# Patient Record
Sex: FEMALE | Race: WHITE | NOT HISPANIC OR LATINO | Employment: OTHER | URBAN - METROPOLITAN AREA
[De-identification: names, ages, dates, MRNs, and addresses within clinical notes are randomized per-mention and may not be internally consistent; named-entity substitution may affect disease eponyms.]

---

## 2023-08-08 ENCOUNTER — APPOINTMENT (OUTPATIENT)
Dept: PRIMARY CARE | Facility: CLINIC | Age: 83
End: 2023-08-08
Payer: MEDICARE

## 2023-09-28 ENCOUNTER — APPOINTMENT (OUTPATIENT)
Dept: PRIMARY CARE | Facility: CLINIC | Age: 83
End: 2023-09-28
Payer: MEDICARE

## 2023-10-14 ENCOUNTER — HOSPITAL ENCOUNTER (EMERGENCY)
Facility: HOSPITAL | Age: 83
Discharge: HOME | End: 2023-10-14
Attending: NURSE PRACTITIONER
Payer: MEDICARE

## 2023-10-14 VITALS
RESPIRATION RATE: 12 BRPM | DIASTOLIC BLOOD PRESSURE: 85 MMHG | HEIGHT: 62 IN | TEMPERATURE: 98.5 F | HEART RATE: 94 BPM | BODY MASS INDEX: 28.52 KG/M2 | SYSTOLIC BLOOD PRESSURE: 128 MMHG | OXYGEN SATURATION: 95 % | WEIGHT: 155 LBS

## 2023-10-14 DIAGNOSIS — R11.10 RETCHING: Primary | ICD-10-CM

## 2023-10-14 PROCEDURE — S0119 ONDANSETRON 4 MG: HCPCS | Mod: SE

## 2023-10-14 PROCEDURE — 2500000004 HC RX 250 GENERAL PHARMACY W/ HCPCS (ALT 636 FOR OP/ED): Mod: SE

## 2023-10-14 PROCEDURE — 2500000005 HC RX 250 GENERAL PHARMACY W/O HCPCS: Mod: SE

## 2023-10-14 PROCEDURE — 99283 EMERGENCY DEPT VISIT LOW MDM: CPT

## 2023-10-14 RX ORDER — ONDANSETRON 4 MG/1
4 TABLET, FILM COATED ORAL EVERY 6 HOURS
Qty: 12 TABLET | Refills: 0 | Status: SHIPPED | OUTPATIENT
Start: 2023-10-14 | End: 2023-10-17

## 2023-10-14 RX ORDER — ONDANSETRON 4 MG/1
4 TABLET, ORALLY DISINTEGRATING ORAL ONCE
Status: COMPLETED | OUTPATIENT
Start: 2023-10-14 | End: 2023-10-14

## 2023-10-14 RX ORDER — ONDANSETRON 4 MG/1
TABLET, ORALLY DISINTEGRATING ORAL
Status: COMPLETED
Start: 2023-10-14 | End: 2023-10-14

## 2023-10-14 RX ORDER — ONDANSETRON 4 MG/1
TABLET, FILM COATED ORAL
Status: DISCONTINUED
Start: 2023-10-14 | End: 2023-10-14 | Stop reason: WASHOUT

## 2023-10-14 RX ADMIN — ONDANSETRON 4 MG: 4 TABLET, ORALLY DISINTEGRATING ORAL at 22:12

## 2023-10-14 ASSESSMENT — PAIN - FUNCTIONAL ASSESSMENT: PAIN_FUNCTIONAL_ASSESSMENT: 0-10

## 2023-10-14 ASSESSMENT — PAIN SCALES - GENERAL: PAINLEVEL_OUTOF10: 0 - NO PAIN

## 2023-10-15 NOTE — ED PROVIDER NOTES
"HPI   Chief Complaint   Patient presents with   • Nausea     Nausea and dry heeving since yesterday       HPI:  83-year-old female presents to the emergency room for \"dry heaving\".  She states that she had some cranberry juice earlier this evening and afterwards started having these episodes of dry heaving.  She states nothing has come up.  She does have a history of esophageal strictures and has had food boluses in the past.  She states that usually she can feel something stuck but nothing feels stuck right now.  She is able to keep fluids down and has not thrown up any vomit at all including any liquid.  She is just having retching.  Denies any fevers, body aches, chills or malaise.                                [place] Coma Scale Score: 15                  Patient History   Past Medical History:   Diagnosis Date   • Bipolar disorder, unspecified (CMS/[facility]) 05/04/2017    Psychosis, bipolar affective   • Encounter for gynecological examination (general) (routine) without abnormal findings     Well female exam with routine gynecological exam   • History of falling 06/16/2017    History of fall   • Other conditions influencing health status 02/09/2016    No significant past medical history   • Personal history of colonic polyps 08/22/2019    History of colonic polyps   • Personal history of other diseases of the digestive system 02/09/2016    History of gallstones   • Personal history of other diseases of the digestive system 02/09/2016    History of hiatal hernia   • Personal history of other malignant neoplasm of skin 02/09/2016    History of malignant neoplasm of skin     Past Surgical History:   Procedure Laterality Date   • APPENDECTOMY  02/09/2016    Appendectomy   • CHOLECYSTECTOMY  08/22/2019    Cholecystectomy   • CT ABDOMEN ANGIOGRAM W AND/OR WO IV CONTRAST  7/27/2013    CT ABDOMEN ANGIOGRAM W AND/OR WO IV CONTRAST [facility]GAC[facility]   • GASTRIC FUNDOPLICATION  08/22/2019    Esophagogastric Fundoplasty " Nissen Fundoplication   • HERNIA REPAIR  08/22/2019    Hernia Repair   • OTHER SURGICAL HISTORY  12/29/2019    Esophagogastroduodenoscopy   • OTHER SURGICAL HISTORY  02/09/2016    Biopsy Skin   • TOTAL HIP ARTHROPLASTY  08/22/2019    Total Hip Replacement     No family history on file.  Social History     Tobacco Use   • Smoking status: Not on file   • Smokeless tobacco: Not on file   Substance Use Topics   • Alcohol use: Not on file   • Drug use: Not on file       Physical Exam   ED Triage Vitals [10/14/23 2133]   Temp Heart Rate Resp BP   36.9 °C (98.5 °F) 94 12 128/85      SpO2 Temp Source Heart Rate Source Patient Position   95 % Tympanic -- Sitting      BP Location FiO2 (%)     Left arm --       Physical Exam  Constitutional:       Appearance: Normal appearance.   HENT:      Head: Normocephalic and atraumatic.      Nose: Nose normal.   Eyes:      Extraocular Movements: Extraocular movements intact.      Pupils: Pupils are equal, round, and reactive to light.   Cardiovascular:      Rate and Rhythm: Normal rate and regular rhythm.   Pulmonary:      Effort: Pulmonary effort is normal.      Breath sounds: Normal breath sounds.   Abdominal:      General: Bowel sounds are normal.   Musculoskeletal:         General: Normal range of motion.      Cervical back: Normal range of motion.   Skin:     General: Skin is warm and dry.      Capillary Refill: Capillary refill takes 2 to 3 seconds.   Neurological:      General: No focal deficit present.      Mental Status: She is alert and oriented to person, place, and time.         ED Course & MDM   Diagnoses as of 10/14/23 3581   Retching       Medical Decision Making  Patient had Zofran and a fluid challenge and she was able to keep down an entire glass of water.  She was watched for 15 to 20 minutes and had no emesis.  She will be prescribed some Zofran and referred to GI for follow-up.  Return to the emergency room with any worsening symptoms or vomiting and not being able  to keep fluids down.        Procedure  Procedures     Luc Diego, TRINI-JERAD  10/14/23 6984

## 2024-02-19 NOTE — PROGRESS NOTES
Subjective   Reason for Visit: Nicole Samson is an 84 y.o. female here for a Medicare Wellness visit.     Past Medical, Surgical, and Family History reviewed and updated in chart.    Reviewed all medications by prescribing practitioner or clinical pharmacist (such as prescriptions, OTCs, herbal therapies and supplements) and documented in the medical record.    HPI    Patient Care Team:  Adilene Corrales MD as PCP - General (Family Medicine)  Gill Hartley DO as PCP - [employer] PCP     Nicole presents as a new patient for a CPE. She lives alone. She does not have family in the area but does have friends. She does not drive but has friends who drives her and she takes public transportation. She does not have a smoking history.  She previously saw a local provider last summer for 1 visit but did not follow-up there. That time she had mild elevation of her blood pressure and was told to monitor it and follow-up in 3 months. She has a wrist monitor at home and periodically checks her blood pressure with normal numbers.  She had a coronary calcium score in 2022 of 718.  Subsequently she had a normal nuclear stress test.  She has had no recent falls.  She does notice some imbalance.  She has gone through physical therapy after her hip replacement a few years ago.  Last mammogram was 2022.  Consultants: psychiatry for bipolar disorder.  She sees an eye doctor regularly for diagnosis of macular degeneration.    Review of Systems  Constitutional Symptoms: She is negative for headaches, fatigue, sleep disturbance.   Eyes: She is negative for loss or blurring of vision.   Cardiovascular: She is negative for chest pain, palpitations.  Occasionally get swelling of her legs at the end of the day.  She eats a lot of prepared foods.  Respiratory: She is negative for shortness of breath, dyspnea on exertion, coughing.   Breast: She is negative for tenderness, masses  Gastrointestinal: She is negative for  "indigestion, abdominal pain, change in bowel habits, blood in stool.   Female Genitourinary: She is negative for nocturia.  She is negative for frequency.   Musculoskeletal: She is negative for myalgias, cramps.   Integumentary: She is negative for skin trouble or rash, non-healing skin lesion.   Neurological: She is negative for numbness, tingling   Endocrine: She is negative for heat or cold intolerance, polyuria, polydipsia, tremor.       Objective   Vitals:  /88   Pulse 80   Ht 1.537 m (5' 0.5\")   Wt 72.6 kg (160 lb)   SpO2 98%   BMI 30.73 kg/m²       Physical Exam  She is alert and in no acute distress  Eyes PERRL, EOMI.  She has bilateral hearing aids in place.,  Nose is noncongested.  Throat is noninjected and without exudate.  Neck is supple without adenopathy.  Lungs are clear to auscultation.  Heart is regular in rhythm and rate.  Normal S1 and S2 without murmurs or gallops.  Extremities are without edema.  Breasts are without masses or tenderness.  No axillary adenopathy.  Abdomen is soft and nontender.  No rebound guarding or masses.  Skill skeletal is intact.  She has a slight kyphosis of her back. No joint inflammation.  Skin shows normal turgor.  Neuro is intact and nonfocal.  Mood and affect are healthy.    Assessment/Plan   Problem List Items Addressed This Visit    None  Visit Diagnoses       Well adult exam    -  Primary    Relevant Orders    Comprehensive Metabolic Panel    Lipid Panel    Urinalysis with Reflex Microscopic    Primary hypertension        Relevant Orders    CBC and Auto Differential    TSH with reflex to Free T4 if abnormal    ECG 12 lead (Clinic Performed)    Obesity without serious comorbidity, unspecified classification, unspecified obesity type        Visit for screening mammogram        Relevant Orders    BI mammo bilateral screening tomosynthesis    Postmenopausal status (age-related) (natural)        Relevant Orders    XR DEXA bone density        I recommend she " get an arm cuff and take her blood pressure once or twice a week.  She may have a friend to help her if needed.  She should write those numbers down and follow-up with me in 3 months for a blood pressure check.

## 2024-02-20 ENCOUNTER — OFFICE VISIT (OUTPATIENT)
Dept: PRIMARY CARE | Facility: CLINIC | Age: 84
End: 2024-02-20
Payer: MEDICARE

## 2024-02-20 VITALS
HEIGHT: 61 IN | SYSTOLIC BLOOD PRESSURE: 142 MMHG | WEIGHT: 160 LBS | HEART RATE: 80 BPM | DIASTOLIC BLOOD PRESSURE: 88 MMHG | BODY MASS INDEX: 30.21 KG/M2 | OXYGEN SATURATION: 98 %

## 2024-02-20 DIAGNOSIS — Z78.0 POSTMENOPAUSAL STATUS (AGE-RELATED) (NATURAL): ICD-10-CM

## 2024-02-20 DIAGNOSIS — Z00.00 WELL ADULT EXAM: Primary | ICD-10-CM

## 2024-02-20 DIAGNOSIS — Z12.31 VISIT FOR SCREENING MAMMOGRAM: ICD-10-CM

## 2024-02-20 DIAGNOSIS — I10 PRIMARY HYPERTENSION: ICD-10-CM

## 2024-02-20 DIAGNOSIS — E66.9 OBESITY WITHOUT SERIOUS COMORBIDITY, UNSPECIFIED CLASSIFICATION, UNSPECIFIED OBESITY TYPE: ICD-10-CM

## 2024-02-20 PROCEDURE — 3079F DIAST BP 80-89 MM HG: CPT | Performed by: FAMILY MEDICINE

## 2024-02-20 PROCEDURE — G0438 PPPS, INITIAL VISIT: HCPCS | Performed by: FAMILY MEDICINE

## 2024-02-20 PROCEDURE — 93000 ELECTROCARDIOGRAM COMPLETE: CPT | Performed by: FAMILY MEDICINE

## 2024-02-20 PROCEDURE — 1036F TOBACCO NON-USER: CPT | Performed by: FAMILY MEDICINE

## 2024-02-20 PROCEDURE — 1123F ACP DISCUSS/DSCN MKR DOCD: CPT | Performed by: FAMILY MEDICINE

## 2024-02-20 PROCEDURE — 3077F SYST BP >= 140 MM HG: CPT | Performed by: FAMILY MEDICINE

## 2024-02-20 PROCEDURE — 1170F FXNL STATUS ASSESSED: CPT | Performed by: FAMILY MEDICINE

## 2024-02-20 PROCEDURE — 1158F ADVNC CARE PLAN TLK DOCD: CPT | Performed by: FAMILY MEDICINE

## 2024-02-20 PROCEDURE — 1160F RVW MEDS BY RX/DR IN RCRD: CPT | Performed by: FAMILY MEDICINE

## 2024-02-20 PROCEDURE — 99212 OFFICE O/P EST SF 10 MIN: CPT | Performed by: FAMILY MEDICINE

## 2024-02-20 PROCEDURE — 1126F AMNT PAIN NOTED NONE PRSNT: CPT | Performed by: FAMILY MEDICINE

## 2024-02-20 PROCEDURE — 1159F MED LIST DOCD IN RCRD: CPT | Performed by: FAMILY MEDICINE

## 2024-02-20 RX ORDER — LAMOTRIGINE 100 MG/1
100 TABLET ORAL
COMMUNITY

## 2024-02-20 RX ORDER — BUPROPION HYDROCHLORIDE 300 MG/1
300 TABLET ORAL
COMMUNITY

## 2024-02-20 RX ORDER — QUETIAPINE FUMARATE 100 MG/1
100 TABLET, FILM COATED ORAL NIGHTLY
COMMUNITY

## 2024-02-20 RX ORDER — ACETAMINOPHEN 325 MG/1
325 TABLET ORAL EVERY 6 HOURS PRN
COMMUNITY

## 2024-02-20 RX ORDER — MULTIVITAMIN
1 TABLET ORAL DAILY
COMMUNITY

## 2024-02-20 RX ORDER — ARIPIPRAZOLE 2 MG/1
2 TABLET ORAL DAILY
COMMUNITY

## 2024-02-20 RX ORDER — ASPIRIN 81 MG/1
81 TABLET ORAL
COMMUNITY

## 2024-02-20 ASSESSMENT — PATIENT HEALTH QUESTIONNAIRE - PHQ9
1. LITTLE INTEREST OR PLEASURE IN DOING THINGS: NOT AT ALL
SUM OF ALL RESPONSES TO PHQ9 QUESTIONS 1 AND 2: 0
2. FEELING DOWN, DEPRESSED OR HOPELESS: NOT AT ALL

## 2024-02-20 ASSESSMENT — ENCOUNTER SYMPTOMS
DEPRESSION: 0
OCCASIONAL FEELINGS OF UNSTEADINESS: 1
LOSS OF SENSATION IN FEET: 0

## 2024-02-20 ASSESSMENT — ACTIVITIES OF DAILY LIVING (ADL)
BATHING: INDEPENDENT
MANAGING_FINANCES: INDEPENDENT
TAKING_MEDICATION: INDEPENDENT
DOING_HOUSEWORK: INDEPENDENT
GROCERY_SHOPPING: INDEPENDENT
DRESSING: INDEPENDENT

## 2024-02-20 ASSESSMENT — PAIN SCALES - GENERAL: PAINLEVEL: 0-NO PAIN

## 2024-02-20 NOTE — PATIENT INSTRUCTIONS
It was a pleasure to meet you!  Please get your fasting blood work done today across the street.  I will notify you of those results.  You may schedule your mammogram and bone density at your convenience.  Please get an arm blood pressure cuff and check your blood pressure once or twice a week.  Follow-up with me in 3 months and bring your blood pressure readings with you.

## 2024-03-12 ENCOUNTER — HOSPITAL ENCOUNTER (OUTPATIENT)
Dept: RADIOLOGY | Facility: HOSPITAL | Age: 84
Discharge: HOME | End: 2024-03-12
Payer: MEDICARE

## 2024-03-12 ENCOUNTER — LAB (OUTPATIENT)
Dept: LAB | Facility: LAB | Age: 84
End: 2024-03-12
Payer: MEDICARE

## 2024-03-12 VITALS — HEIGHT: 61 IN | WEIGHT: 155 LBS | BODY MASS INDEX: 29.27 KG/M2

## 2024-03-12 DIAGNOSIS — I10 PRIMARY HYPERTENSION: ICD-10-CM

## 2024-03-12 DIAGNOSIS — Z78.0 POSTMENOPAUSAL STATUS (AGE-RELATED) (NATURAL): ICD-10-CM

## 2024-03-12 DIAGNOSIS — Z00.00 WELL ADULT EXAM: ICD-10-CM

## 2024-03-12 LAB
ALBUMIN SERPL BCP-MCNC: 4.2 G/DL (ref 3.4–5)
ALP SERPL-CCNC: 88 U/L (ref 33–136)
ALT SERPL W P-5'-P-CCNC: 20 U/L (ref 7–45)
ANION GAP SERPL CALC-SCNC: 11 MMOL/L (ref 10–20)
AST SERPL W P-5'-P-CCNC: 22 U/L (ref 9–39)
BASOPHILS # BLD AUTO: 0.06 X10*3/UL (ref 0–0.1)
BASOPHILS NFR BLD AUTO: 1 %
BILIRUB SERPL-MCNC: 0.5 MG/DL (ref 0–1.2)
BUN SERPL-MCNC: 16 MG/DL (ref 6–23)
CALCIUM SERPL-MCNC: 9.9 MG/DL (ref 8.6–10.3)
CHLORIDE SERPL-SCNC: 104 MMOL/L (ref 98–107)
CHOLEST SERPL-MCNC: 219 MG/DL (ref 0–199)
CHOLESTEROL/HDL RATIO: 4
CO2 SERPL-SCNC: 28 MMOL/L (ref 21–32)
CREAT SERPL-MCNC: 0.98 MG/DL (ref 0.5–1.05)
EGFRCR SERPLBLD CKD-EPI 2021: 58 ML/MIN/1.73M*2
EOSINOPHIL # BLD AUTO: 0.14 X10*3/UL (ref 0–0.4)
EOSINOPHIL NFR BLD AUTO: 2.4 %
ERYTHROCYTE [DISTWIDTH] IN BLOOD BY AUTOMATED COUNT: 13.2 % (ref 11.5–14.5)
GLUCOSE SERPL-MCNC: 106 MG/DL (ref 74–99)
HCT VFR BLD AUTO: 45 % (ref 36–46)
HDLC SERPL-MCNC: 54.2 MG/DL
HGB BLD-MCNC: 14.3 G/DL (ref 12–16)
IMM GRANULOCYTES # BLD AUTO: 0.03 X10*3/UL (ref 0–0.5)
IMM GRANULOCYTES NFR BLD AUTO: 0.5 % (ref 0–0.9)
LDLC SERPL CALC-MCNC: 129 MG/DL
LYMPHOCYTES # BLD AUTO: 1.81 X10*3/UL (ref 0.8–3)
LYMPHOCYTES NFR BLD AUTO: 30.7 %
MCH RBC QN AUTO: 27.7 PG (ref 26–34)
MCHC RBC AUTO-ENTMCNC: 31.8 G/DL (ref 32–36)
MCV RBC AUTO: 87 FL (ref 80–100)
MONOCYTES # BLD AUTO: 0.45 X10*3/UL (ref 0.05–0.8)
MONOCYTES NFR BLD AUTO: 7.6 %
NEUTROPHILS # BLD AUTO: 3.4 X10*3/UL (ref 1.6–5.5)
NEUTROPHILS NFR BLD AUTO: 57.8 %
NON HDL CHOLESTEROL: 165 MG/DL (ref 0–149)
NRBC BLD-RTO: 0 /100 WBCS (ref 0–0)
PLATELET # BLD AUTO: 240 X10*3/UL (ref 150–450)
POTASSIUM SERPL-SCNC: 4.1 MMOL/L (ref 3.5–5.3)
PROT SERPL-MCNC: 7.1 G/DL (ref 6.4–8.2)
RBC # BLD AUTO: 5.17 X10*6/UL (ref 4–5.2)
SODIUM SERPL-SCNC: 139 MMOL/L (ref 136–145)
T4 FREE SERPL-MCNC: 0.74 NG/DL (ref 0.61–1.12)
TRIGL SERPL-MCNC: 180 MG/DL (ref 0–149)
TSH SERPL-ACNC: 4.59 MIU/L (ref 0.44–3.98)
VLDL: 36 MG/DL (ref 0–40)
WBC # BLD AUTO: 5.9 X10*3/UL (ref 4.4–11.3)

## 2024-03-12 PROCEDURE — 77063 BREAST TOMOSYNTHESIS BI: CPT | Performed by: RADIOLOGY

## 2024-03-12 PROCEDURE — 36415 COLL VENOUS BLD VENIPUNCTURE: CPT

## 2024-03-12 PROCEDURE — 80053 COMPREHEN METABOLIC PANEL: CPT

## 2024-03-12 PROCEDURE — 77067 SCR MAMMO BI INCL CAD: CPT | Performed by: RADIOLOGY

## 2024-03-12 PROCEDURE — 77067 SCR MAMMO BI INCL CAD: CPT

## 2024-03-12 PROCEDURE — 85025 COMPLETE CBC W/AUTO DIFF WBC: CPT

## 2024-03-12 PROCEDURE — 77080 DXA BONE DENSITY AXIAL: CPT

## 2024-03-12 PROCEDURE — 80061 LIPID PANEL: CPT

## 2024-03-12 PROCEDURE — 84443 ASSAY THYROID STIM HORMONE: CPT

## 2024-03-12 PROCEDURE — 84439 ASSAY OF FREE THYROXINE: CPT

## 2024-03-20 ENCOUNTER — TELEPHONE (OUTPATIENT)
Dept: PRIMARY CARE | Facility: CLINIC | Age: 84
End: 2024-03-20
Payer: MEDICARE

## 2024-03-20 DIAGNOSIS — I10 PRIMARY HYPERTENSION: ICD-10-CM

## 2024-04-08 ENCOUNTER — HOSPITAL ENCOUNTER (OUTPATIENT)
Dept: RADIOLOGY | Facility: HOSPITAL | Age: 84
Discharge: HOME | End: 2024-04-08
Payer: MEDICARE

## 2024-04-08 ENCOUNTER — APPOINTMENT (OUTPATIENT)
Dept: RADIOLOGY | Facility: HOSPITAL | Age: 84
End: 2024-04-08
Payer: MEDICARE

## 2024-04-08 DIAGNOSIS — R92.8 ABNORMAL MAMMOGRAM: ICD-10-CM

## 2024-04-08 PROCEDURE — G0279 TOMOSYNTHESIS, MAMMO: HCPCS | Performed by: STUDENT IN AN ORGANIZED HEALTH CARE EDUCATION/TRAINING PROGRAM

## 2024-04-08 PROCEDURE — 77062 BREAST TOMOSYNTHESIS BI: CPT

## 2024-04-08 PROCEDURE — 77066 DX MAMMO INCL CAD BI: CPT | Performed by: STUDENT IN AN ORGANIZED HEALTH CARE EDUCATION/TRAINING PROGRAM

## 2024-04-09 DIAGNOSIS — R92.8 ABNORMAL MAMMOGRAM OF RIGHT BREAST: Primary | ICD-10-CM

## 2024-05-17 PROBLEM — R29.6 FREQUENT FALLS: Status: ACTIVE | Noted: 2024-05-17

## 2024-05-17 PROBLEM — F32.A DEPRESSION: Status: ACTIVE | Noted: 2024-05-17

## 2024-05-17 PROBLEM — I10 PRIMARY HYPERTENSION: Status: ACTIVE | Noted: 2024-03-12

## 2024-05-17 PROBLEM — E55.9 VITAMIN D DEFICIENCY: Status: ACTIVE | Noted: 2024-05-17

## 2024-05-17 PROBLEM — R93.1 ABNORMAL HEART SCORE CT: Status: ACTIVE | Noted: 2024-05-17

## 2024-05-17 PROBLEM — K31.84 GASTROPARESIS: Status: ACTIVE | Noted: 2024-05-17

## 2024-05-17 PROBLEM — H81.10 VERTIGO, BENIGN POSITIONAL: Status: ACTIVE | Noted: 2024-05-17

## 2024-05-17 PROBLEM — K21.9 GASTROESOPHAGEAL REFLUX DISEASE: Status: ACTIVE | Noted: 2024-05-17

## 2024-05-17 PROBLEM — Z86.010 HISTORY OF COLONIC POLYPS: Status: ACTIVE | Noted: 2024-05-17

## 2024-05-17 PROBLEM — Z86.0100 HISTORY OF COLONIC POLYPS: Status: ACTIVE | Noted: 2024-05-17

## 2024-05-17 PROBLEM — M81.0 POST-MENOPAUSAL OSTEOPOROSIS: Status: ACTIVE | Noted: 2024-05-17

## 2024-05-17 PROBLEM — R13.10 DYSPHAGIA: Status: ACTIVE | Noted: 2024-05-17

## 2024-05-17 PROBLEM — F31.9 BIPOLAR 1 DISORDER (MULTI): Status: ACTIVE | Noted: 2023-08-29

## 2024-05-17 PROBLEM — E78.5 DYSLIPIDEMIA: Status: ACTIVE | Noted: 2024-05-17

## 2024-05-17 PROBLEM — E66.9 OBESITY: Status: ACTIVE | Noted: 2024-05-17

## 2024-05-17 PROBLEM — R91.8 MULTIPLE PULMONARY NODULES: Status: ACTIVE | Noted: 2024-05-17

## 2024-05-17 PROBLEM — R32 URINARY INCONTINENCE: Status: ACTIVE | Noted: 2024-05-17

## 2024-05-17 NOTE — PROGRESS NOTES
Subjective   Patient ID: Nicole Samson is a 84 y.o. female who presents for Follow-up (Hypertension check.).    JESI Valencia presents for recheck on her blood pressure.   She was seen as a new patient 3 months ago and was told to monitor her blood pressure at home and to reduce her salt intake.  She is on no antihypertensives.   She has been checking her blood pressure at home and it generally runs 135-40/70 5-80.    She also has impaired fasting blood sugar.    She had a slight abnormality on her mammogram in March with a 6-month follow-up recommended.    She had a DEXA scan that indicated no osteoporosis.    Review of Systems    She denies headaches or dizziness.    No chest pain, palpitations or pedal edema.    No shortness of breath or dyspnea on exertion.    Objective   BP 98/76   Pulse 97   Wt 72.6 kg (160 lb)   SpO2 96%   BMI 30.23 kg/m²     Physical Exam    She is alert and in no acute distress.    Vitals are reviewed.   neck is without JVD or carotid bruits.    Heart is regular in rhythm and rate.  Normal S1 and S2.    Lungs are clear to auscultation.    Extremities are without edema    Assessment/Plan   Diagnoses and all orders for this visit:  Elevated blood pressure reading  Impaired fasting blood sugar  Other orders  -     Follow Up In Primary Care - Established  -     Follow Up In Primary Care - Medicare Annual    She was reassured that blood pressure readings are acceptable.  She should continue to avoid salt.  She will follow-up as scheduled for her CPE.

## 2024-05-21 ENCOUNTER — OFFICE VISIT (OUTPATIENT)
Dept: PRIMARY CARE | Facility: CLINIC | Age: 84
End: 2024-05-21
Payer: MEDICARE

## 2024-05-21 ENCOUNTER — TELEPHONE (OUTPATIENT)
Dept: PRIMARY CARE | Facility: CLINIC | Age: 84
End: 2024-05-21

## 2024-05-21 VITALS
HEART RATE: 97 BPM | SYSTOLIC BLOOD PRESSURE: 122 MMHG | BODY MASS INDEX: 30.23 KG/M2 | OXYGEN SATURATION: 96 % | DIASTOLIC BLOOD PRESSURE: 82 MMHG | WEIGHT: 160 LBS

## 2024-05-21 DIAGNOSIS — R73.01 IMPAIRED FASTING BLOOD SUGAR: ICD-10-CM

## 2024-05-21 DIAGNOSIS — E78.5 DYSLIPIDEMIA: Primary | ICD-10-CM

## 2024-05-21 DIAGNOSIS — R03.0 ELEVATED BLOOD PRESSURE READING: Primary | ICD-10-CM

## 2024-05-21 PROBLEM — I10 PRIMARY HYPERTENSION: Status: RESOLVED | Noted: 2024-03-12 | Resolved: 2024-05-21

## 2024-05-21 PROCEDURE — 1159F MED LIST DOCD IN RCRD: CPT | Performed by: FAMILY MEDICINE

## 2024-05-21 PROCEDURE — 1123F ACP DISCUSS/DSCN MKR DOCD: CPT | Performed by: FAMILY MEDICINE

## 2024-05-21 PROCEDURE — 1160F RVW MEDS BY RX/DR IN RCRD: CPT | Performed by: FAMILY MEDICINE

## 2024-05-21 PROCEDURE — 1036F TOBACCO NON-USER: CPT | Performed by: FAMILY MEDICINE

## 2024-05-21 PROCEDURE — 99213 OFFICE O/P EST LOW 20 MIN: CPT | Performed by: FAMILY MEDICINE

## 2024-05-21 PROCEDURE — 1126F AMNT PAIN NOTED NONE PRSNT: CPT | Performed by: FAMILY MEDICINE

## 2024-05-21 ASSESSMENT — PATIENT HEALTH QUESTIONNAIRE - PHQ9
SUM OF ALL RESPONSES TO PHQ9 QUESTIONS 1 AND 2: 0
1. LITTLE INTEREST OR PLEASURE IN DOING THINGS: NOT AT ALL
2. FEELING DOWN, DEPRESSED OR HOPELESS: NOT AT ALL

## 2024-05-21 ASSESSMENT — PAIN SCALES - GENERAL: PAINLEVEL: 0-NO PAIN

## 2024-11-12 ENCOUNTER — TELEPHONE (OUTPATIENT)
Dept: PRIMARY CARE | Facility: CLINIC | Age: 84
End: 2024-11-12
Payer: MEDICARE

## 2024-11-12 NOTE — TELEPHONE ENCOUNTER
Pt called  [phone] she had a mammogram 4-8-24 they wanted her to come back in 6 months for a breast imaging ,please put order in system

## 2024-12-16 ENCOUNTER — APPOINTMENT (OUTPATIENT)
Dept: RADIOLOGY | Facility: HOSPITAL | Age: 84
End: 2024-12-16
Payer: MEDICARE

## 2024-12-19 ENCOUNTER — APPOINTMENT (OUTPATIENT)
Dept: ORTHOPEDIC SURGERY | Facility: CLINIC | Age: 84
End: 2024-12-19
Payer: MEDICARE

## 2024-12-23 ENCOUNTER — TELEPHONE (OUTPATIENT)
Dept: PRIMARY CARE | Facility: CLINIC | Age: 84
End: 2024-12-23
Payer: MEDICARE

## 2024-12-23 DIAGNOSIS — Z12.31 VISIT FOR SCREENING MAMMOGRAM: ICD-10-CM

## 2024-12-23 NOTE — TELEPHONE ENCOUNTER
Patient called and stated she Cancelled appointment for Mammogram in [facility] and she rescheduled for  [place] 01-03-25. [phone]  Patient stated she had to cancel due to [facility] only servicing in [facility].Patient stated she needs a new referral.    Patient can be reached at [phone]

## 2025-01-02 ENCOUNTER — APPOINTMENT (OUTPATIENT)
Dept: ORTHOPEDIC SURGERY | Facility: CLINIC | Age: 85
End: 2025-01-02
Payer: MEDICARE

## 2025-01-02 ENCOUNTER — HOSPITAL ENCOUNTER (OUTPATIENT)
Dept: RADIOLOGY | Facility: CLINIC | Age: 85
Discharge: HOME | End: 2025-01-02
Payer: MEDICARE

## 2025-01-02 VITALS — WEIGHT: 150 LBS | HEIGHT: 61 IN | BODY MASS INDEX: 28.32 KG/M2

## 2025-01-02 DIAGNOSIS — M25.552 LEFT HIP PAIN: ICD-10-CM

## 2025-01-02 PROCEDURE — 73502 X-RAY EXAM HIP UNI 2-3 VIEWS: CPT | Mod: LT

## 2025-01-02 ASSESSMENT — PAIN - FUNCTIONAL ASSESSMENT: PAIN_FUNCTIONAL_ASSESSMENT: NO/DENIES PAIN

## 2025-01-02 NOTE — PROGRESS NOTES
This is a consultation from Dr. Adilene Corrales MD for   Chief Complaint   Patient presents with    Left Hip - Pain       This is a 84 y.o. female who presents for evaluation of left hip pain.  Patient states she had a left total hip done about 14 years ago.  It did very well and there were no immediate postoperative complications.  She has had no drainage from her incision no fevers no chills.  No issues with infection at the time of surgery.  She noticed about 3 weeks ago a new issue with the hip, she had pain over the lateral aspect of the hip.  It was worse when she lay down on that side.  She is using her walker for several days which helped improve the pain.  She has not been taking any medications.  No drainage from her incision no fevers no chills.  No pain going down her leg no numbness or tingling.    Physical Exam    There has been no interval change in this patient's past medical, surgical, medications, allergies, family history or social history since the most recent visit to a provider within our department. 14 point review of systems was performed, reviewed, and negative except for pertinent positives documented in the history of present illness.     Constitutional: well developed, well nourished female in no acute distress  Psychiatric: normal mood, appropriate affect  Eyes: sclera anicteric  HENT: normocephalic/atraumatic  CV: regular rate and rhythm   Respiratory: non labored breathing  Integumentary: no rash  Neurological: moves all extremities    Left hip examination: Well-healed surgical incision erythema no drainage, mildly tender over the greater trochanter.  No pain with range of motion neurovascular tact distally    Xrays were ordered by me, they were reviewed and independently interpreted by me today, they show stable left total hip arthroplasty when compared to previous films in the system, no fracture dislocation or loosening.    Procedures      Impression/Plan: This is a 84 y.o.  "female status post left total hip with greater trochanteric pain syndrome.  I discussed the risks and benefits of the various treatment options with the patient in detail, treatments for greater trochanteric pain syndrome include use of oral anti-inflammatory medications, use of a cane in the opposite hand, physical therapy, activity modification, and cortisone injection.  I will see her back as needed    BMI Readings from Last 1 Encounters:   01/02/25 28.34 kg/m²      Lab Results   Component Value Date    CREATININE 0.98 03/12/2024     Tobacco Use: Low Risk  (1/2/2025)    Patient History     Smoking Tobacco Use: Never     Smokeless Tobacco Use: Never     Passive Exposure: Not on file      Computed MELD 3.0 unavailable. One or more values for this score either were not found within the given timeframe or did not fit some other criterion.  Computed MELD-Na unavailable. One or more values for this score either were not found within the given timeframe or did not fit some other criterion.       Lab Results   Component Value Date    HGBA1C 5.3 09/18/2019     No results found for: \"STAPHMRSASCR\"  "

## 2025-01-03 ENCOUNTER — HOSPITAL ENCOUNTER (OUTPATIENT)
Dept: RADIOLOGY | Facility: HOSPITAL | Age: 85
Discharge: HOME | End: 2025-01-03
Payer: MEDICARE

## 2025-01-03 DIAGNOSIS — R92.8 ABNORMAL MAMMOGRAM OF RIGHT BREAST: ICD-10-CM

## 2025-01-03 PROCEDURE — 76982 USE 1ST TARGET LESION: CPT | Mod: RT

## 2025-01-03 PROCEDURE — 76642 ULTRASOUND BREAST LIMITED: CPT | Mod: RT

## 2025-01-03 PROCEDURE — 77065 DX MAMMO INCL CAD UNI: CPT | Mod: RT

## 2025-01-06 ENCOUNTER — TELEPHONE (OUTPATIENT)
Dept: PRIMARY CARE | Facility: CLINIC | Age: 85
End: 2025-01-06
Payer: MEDICARE

## 2025-01-08 NOTE — PROGRESS NOTES
Subjective   Patient ID: Nicole Samson is a 84 y.o. female who presents for Right US breast bx consult, bx 1/28.  HPI  Patient is new to clinic and presents for Right US breast bx consult, bx 1/28 .  Patient is referred by Dr. Adilene Rod.  Patient had BI Mammo right diagnostic tomosynthesis and BI US breast limited right completed on 1/3/2025. Impression was suspicious group of calcifications with a small associated soft issue density right breast 9 o'clock position 6 cm from the nipple with a maximum diameter 6 mm. 1 lymph node within the right axilla demonstrates an absence of a fatty hilum. Biopsy of this may be appropriate as well. Need for biopsy was discussed with the patient at the time of the study. Due to results order was placed for biopsy and consult with Dr. Wilson per Dr. Rod.  Patient denies any previous breast biopsies.  No palpable masses.  No nipple discharge.  No known family history of breast disease.    BI MAMMO RIGHT DIAGNOSTIC TOMOSYNTHESIS; BI US BREAST LIMITED RIGHT;  1/3/2025 10:17 am; 1/3/2025 11:27 am      ACCESSION NUMBER(S):  [id number]; [id number]      ORDERING CLINICIAN:  ADILENE ROD      INDICATION:  Follow-up calcifications right breast.      ,R92.8 Other abnormal and inconclusive findings on diagnostic imaging  of breast      COMPARISON:  2024, 2022, 2019, 2017      FINDINGS:  MAMMOGRAPHY: 2D and tomosynthesis images were reviewed at 1 mm slice  thickness.      Density:  There are scattered areas of fibroglandular density.      There is a cluster of calcifications right breast at approximately  the 9 o'clock position, pleomorphic in appearance on the current exam  with the overall cluster measuring approximately 7 mm in diameter.  There is an increase in the number and pleomorphism of these  calcifications when compared to the prior study.      ULTRASOUND: Targeted ultrasound was performed of the right breast by  a registered sonographer with elastography. There is  a small, 3 x 4 x  5 mm hypoechoic solid-appearing nodule with microcalcifications with  some internal vascularity corresponding with the mammographic density  described above, located at the 9 o'clock position right breast with  distance from nipple of 6 cm. There is a small lymph node identified  within the right axilla which does not demonstrate a fatty hilum with  this lymph node measuring 3 x 5 x 6 mm. Biopsy of this lymph node  along with the area of calcifications with associated small soft  tissue density is suggested.       Impression   Suspicious group of calcifications with a small associated soft  tissue density right breast 9 o'clock position 6 cm from the nipple  with a maximum diameter 6 mm. 1 lymph node within the right axilla  demonstrates an absence of a fatty hilum. Biopsy of this may be  appropriate as well. Need for biopsy was discussed with the patient  at the time of the study.      BI-RADS CATEGORY:  BI-RADS Category:  4 Suspicious.  Recommendation:  Surgical Consultation and Biopsy.  Recommended Date:  Immediate.  Laterality:  Right.    MACRO:  Critical Finding:  See findings. Notification was initiated on  1/3/2025 at 12:16 pm by  Jed Rosario.  (**-YCF-**) Instructions:  See Impression for specific recommendations.     Previous Biopsy: NO Menarche Age: 14 Age of first delivery: N/A Breastfeed: N/A Menopause age: LATE 40S HRT: NO Family history of breast cancer: NO Family history of ovarian cancer: NO Family history of pancreatic cancer: NO G 0 P 0     Social History:  Tobacco Use - NO  Do you use any recreational drugs - NO  Alcohol Use - NO  Caffeine Intake - NO  Working - RETIRED  Marital status - SINGLE  Living with - SELF     Past Medical History:   Diagnosis Date    Bipolar disorder, unspecified ([facility]) 05/04/2017    Psychosis, bipolar affective    Encounter for gynecological examination (general) (routine) without abnormal findings     Well female exam with routine gynecological exam  "   History of falling 06/16/2017    History of fall    Other conditions influencing health status 02/09/2016    No significant past medical history    Personal history of colonic polyps 08/22/2019    History of colonic polyps    Personal history of other diseases of the digestive system 02/09/2016    History of gallstones    Personal history of other diseases of the digestive system 02/09/2016    History of hiatal hernia    Personal history of other malignant neoplasm of skin 02/09/2016    History of malignant neoplasm of skin     Past Surgical History:   Procedure Laterality Date    APPENDECTOMY  02/09/2016    Appendectomy    CHOLECYSTECTOMY  08/22/2019    Cholecystectomy    CT ABDOMEN ANGIOGRAM W AND/OR WO IV CONTRAST  7/27/2013    CT ABDOMEN ANGIOGRAM W AND/OR WO IV CONTRAST [facility]    GASTRIC FUNDOPLICATION  08/22/2019    Esophagogastric Fundoplasty Nissen Fundoplication    HERNIA REPAIR  08/22/2019    Hernia Repair    OTHER SURGICAL HISTORY  12/29/2019    Esophagogastroduodenoscopy    OTHER SURGICAL HISTORY  02/09/2016    Biopsy Skin    TOTAL HIP ARTHROPLASTY  08/22/2019    Total Hip Replacement     Family History   Problem Relation Name Age of Onset    Heart disease Mother      Dementia Brother       No Known Allergies      Review of Systems  BP (!) 118/94 (BP Location: Left arm, Patient Position: Sitting, BP Cuff Size: Adult)   Pulse 87   Temp 36.5 °C (97.7 °F) (Skin)   Resp 17   Ht 1.549 m (5' 1\")   Wt 68 kg (150 lb)   SpO2 97%   BMI 28.34 kg/m²     Objective   Physical Exam  Physical Exam:  BP (!) 118/94 (BP Location: Left arm, Patient Position: Sitting, BP Cuff Size: Adult)   Pulse 87   Temp 36.5 °C (97.7 °F) (Skin)   Resp 17   Ht 1.549 m (5' 1\")   Wt 68 kg (150 lb)   SpO2 97%   BMI 28.34 kg/m²    GENERAL APPEARANCE: Patient appears in no acute distress.   EYES: Sclera non-icteric, PERRLA.   ENT Normal appearance of ears and nose.   NECK/THYROID: Neck: no masses. Thyroid: no " masses.   LYMPH NODES: No cervical or supraclavicular lymphadenopathy.   CARDIOVASCULAR Heart: RRR, no murmurs; Carotid bruits: none; Peripheral edema: none.   RESPIRATORY: Lungs: Bilateral inspiratory and expiratory wheezing; no respiratory distress.   BREASTS: Exam done both in upright and supine position. On inspection no skin dimpling, no peau d'orange; Masses: none palpable in either breast.  Axillary lymphadenopathy: none.   GI (ABDOMEN) obese No intraabdominal mass appreciated, no hepatosplenomegaly; Hernia: none scar from previous hernia repair; Tenderness: none.   PSYCH: Patient oriented to time, place and person, normal affect.    Assessment/Plan   Problem List Items Addressed This Visit             ICD-10-CM    Mass of upper outer quadrant of right breast - Primary N63.11     Patient with increasingly abnormal microcalcifications right breast 9 o'clock position that on ultrasound is a 5 mm lesion.  Recommend ultrasound-guided core biopsy.Patient to be scheduled for ultrasound guided  biopsy of the breast in the radiology department. Risk, benefits and alternatives to this plan were thoroughly discussed with the patient who agrees to proceed.  The procedure was also thoroughly discussed as well as her follow-up. She is to see me in the office in one week but I also will call as soon as I see the pathology which is usually 4 working days from procedure.          Lymphadenopathy, axillary R59.0     Patient with a right axillary lymph node with loss of fatty hilum.  Recommend ultrasound-guided core biopsy of the lymph node iat the same time as the breast biopsy.                 Loan Virk MA 01/16/25 12:46 PM

## 2025-01-10 ENCOUNTER — TELEPHONE (OUTPATIENT)
Dept: PRIMARY CARE | Facility: CLINIC | Age: 85
End: 2025-01-10
Payer: MEDICARE

## 2025-01-10 NOTE — TELEPHONE ENCOUNTER
Pt needs referral to see Dr Wilson for abnormal mammo .   Also an order for a us of right breast.

## 2025-01-13 ENCOUNTER — TELEPHONE (OUTPATIENT)
Dept: PRIMARY CARE | Facility: CLINIC | Age: 85
End: 2025-01-13
Payer: MEDICARE

## 2025-01-13 NOTE — TELEPHONE ENCOUNTER
We received a letter in the mail from patient stating she had burning and itching around her vagina.    Reception LM for patient to call and schedule an appointment.

## 2025-01-16 ENCOUNTER — OFFICE VISIT (OUTPATIENT)
Dept: SURGERY | Facility: CLINIC | Age: 85
End: 2025-01-16
Payer: MEDICARE

## 2025-01-16 VITALS
HEIGHT: 61 IN | OXYGEN SATURATION: 97 % | DIASTOLIC BLOOD PRESSURE: 94 MMHG | WEIGHT: 150 LBS | BODY MASS INDEX: 28.32 KG/M2 | TEMPERATURE: 97.7 F | SYSTOLIC BLOOD PRESSURE: 118 MMHG | HEART RATE: 87 BPM | RESPIRATION RATE: 17 BRPM

## 2025-01-16 DIAGNOSIS — R59.0 LYMPHADENOPATHY, AXILLARY: ICD-10-CM

## 2025-01-16 DIAGNOSIS — N63.11 MASS OF UPPER OUTER QUADRANT OF RIGHT BREAST: Primary | ICD-10-CM

## 2025-01-16 PROCEDURE — 99214 OFFICE O/P EST MOD 30 MIN: CPT | Performed by: SURGERY

## 2025-01-16 PROCEDURE — 1159F MED LIST DOCD IN RCRD: CPT | Performed by: SURGERY

## 2025-01-16 PROCEDURE — 99204 OFFICE O/P NEW MOD 45 MIN: CPT | Performed by: SURGERY

## 2025-01-16 PROCEDURE — 1036F TOBACCO NON-USER: CPT | Performed by: SURGERY

## 2025-01-16 PROCEDURE — 1123F ACP DISCUSS/DSCN MKR DOCD: CPT | Performed by: SURGERY

## 2025-01-16 PROCEDURE — 1126F AMNT PAIN NOTED NONE PRSNT: CPT | Performed by: SURGERY

## 2025-01-16 ASSESSMENT — LIFESTYLE VARIABLES
SKIP TO QUESTIONS 9-10: 1
HOW OFTEN DO YOU HAVE A DRINK CONTAINING ALCOHOL: NEVER
AUDIT-C TOTAL SCORE: 0
HOW OFTEN DO YOU HAVE SIX OR MORE DRINKS ON ONE OCCASION: NEVER
HOW MANY STANDARD DRINKS CONTAINING ALCOHOL DO YOU HAVE ON A TYPICAL DAY: PATIENT DOES NOT DRINK

## 2025-01-16 ASSESSMENT — ENCOUNTER SYMPTOMS
DEPRESSION: 0
OCCASIONAL FEELINGS OF UNSTEADINESS: 0
LOSS OF SENSATION IN FEET: 0

## 2025-01-16 ASSESSMENT — COLUMBIA-SUICIDE SEVERITY RATING SCALE - C-SSRS
1. IN THE PAST MONTH, HAVE YOU WISHED YOU WERE DEAD OR WISHED YOU COULD GO TO SLEEP AND NOT WAKE UP?: NO
2. HAVE YOU ACTUALLY HAD ANY THOUGHTS OF KILLING YOURSELF?: NO
6. HAVE YOU EVER DONE ANYTHING, STARTED TO DO ANYTHING, OR PREPARED TO DO ANYTHING TO END YOUR LIFE?: NO

## 2025-01-16 ASSESSMENT — PAIN SCALES - GENERAL: PAINLEVEL_OUTOF10: 0-NO PAIN

## 2025-01-16 ASSESSMENT — PATIENT HEALTH QUESTIONNAIRE - PHQ9
2. FEELING DOWN, DEPRESSED OR HOPELESS: NOT AT ALL
1. LITTLE INTEREST OR PLEASURE IN DOING THINGS: NOT AT ALL
SUM OF ALL RESPONSES TO PHQ9 QUESTIONS 1 & 2: 0

## 2025-01-16 NOTE — ASSESSMENT & PLAN NOTE
Patient with increasingly abnormal microcalcifications right breast 9 o'clock position that on ultrasound is a 5 mm lesion.  Recommend ultrasound-guided core biopsy.Patient to be scheduled for ultrasound guided  biopsy of the breast in the radiology department. Risk, benefits and alternatives to this plan were thoroughly discussed with the patient who agrees to proceed.  The procedure was also thoroughly discussed as well as her follow-up. She is to see me in the office in one week but I also will call as soon as I see the pathology which is usually 4 working days from procedure.

## 2025-01-16 NOTE — ASSESSMENT & PLAN NOTE
Patient with a right axillary lymph node with loss of fatty hilum.  Recommend ultrasound-guided core biopsy of the lymph node iat the same time as the breast biopsy.

## 2025-01-28 ENCOUNTER — HOSPITAL ENCOUNTER (OUTPATIENT)
Dept: RADIOLOGY | Facility: HOSPITAL | Age: 85
Discharge: HOME | End: 2025-01-28
Payer: MEDICARE

## 2025-01-28 DIAGNOSIS — R92.8 OTHER ABNORMAL AND INCONCLUSIVE FINDINGS ON DIAGNOSTIC IMAGING OF BREAST: ICD-10-CM

## 2025-01-28 PROCEDURE — 2500000004 HC RX 250 GENERAL PHARMACY W/ HCPCS (ALT 636 FOR OP/ED): Performed by: RADIOLOGY

## 2025-01-28 PROCEDURE — 77065 DX MAMMO INCL CAD UNI: CPT | Mod: RIGHT SIDE | Performed by: RADIOLOGY

## 2025-01-28 PROCEDURE — A4648 IMPLANTABLE TISSUE MARKER: HCPCS

## 2025-01-28 PROCEDURE — 19083 BX BREAST 1ST LESION US IMAG: CPT | Mod: RT

## 2025-01-28 PROCEDURE — 19083 BX BREAST 1ST LESION US IMAG: CPT | Mod: RIGHT SIDE | Performed by: RADIOLOGY

## 2025-01-28 PROCEDURE — 2720000007 HC OR 272 NO HCPCS

## 2025-01-28 RX ORDER — LIDOCAINE HYDROCHLORIDE 10 MG/ML
10 INJECTION, SOLUTION EPIDURAL; INFILTRATION; INTRACAUDAL; PERINEURAL ONCE
Status: COMPLETED | OUTPATIENT
Start: 2025-01-28 | End: 2025-01-28

## 2025-01-28 RX ADMIN — LIDOCAINE HYDROCHLORIDE 11 ML: 10 INJECTION, SOLUTION EPIDURAL; INFILTRATION; INTRACAUDAL; PERINEURAL at 08:58

## 2025-01-28 ASSESSMENT — PAIN SCALES - GENERAL
PAINLEVEL_OUTOF10: 0 - NO PAIN

## 2025-02-04 LAB
LAB AP ASR DISCLAIMER: NORMAL
LAB AP BLOCK FOR ADDITIONAL STUDIES: NORMAL
LABORATORY COMMENT REPORT: NORMAL
PATH REPORT.FINAL DX SPEC: NORMAL
PATH REPORT.GROSS SPEC: NORMAL
PATH REPORT.RELEVANT HX SPEC: NORMAL
PATH REPORT.TOTAL CANCER: NORMAL
PATHOLOGY SYNOPTIC REPORT: NORMAL

## 2025-02-04 NOTE — PROGRESS NOTES
Subjective   Patient ID: Nicole Samson is a 85 y.o. female who presents for breast biopsy follow up-discuss results.  HPI  Patient returns to clinic and presents for right breast biopsy follow up-discuss results.  Patient was last seen in clinic on 1/16/2025 for Right US breast bx consult, bx 1/28.   Patient has been complaining of some chest discomfort that radiates to her neck.  She has had a couple episodes.    Surgical pathology was collected and ifnalized    FINAL DIAGNOSIS   A. Right breast 9:00 6 cm from nipple, ultrasound guided core needle biopsy:      -- Invasive ductal carcinoma, grade 2, see note.  -- Ductal carcinoma in situ, intermediate nuclear grade, cribriform pattern with necrosis and calcifications.     Note:  In this limited sample, the invasive carcinoma measures up to 4.5 mm in greatest dimension. E-cadherin immunostain is positive in tumor cells and supports ductal phenotype.  See synoptic biomarker summary.     : Dr Shwetha Marlow           Electronically signed by Vanesa Duckworth MD on 2/4/2025 at [address] [place] [address]   By the signature on this report, the individual or group listed as making the Final Interpretation/Diagnosis certifies that they have reviewed this case.    Case Summary Report   Breast Biomarker Reporting Template   Protocol posted: 12/13/2023BREAST BIOMARKER REPORTING TEMPLATE - All Specimens  Test(s) Performed     Estrogen Receptor (ER) Status  Positive (greater than 10% of cells demonstrate nuclear positivity)   Percentage of Cells with Nuclear Positivity  %   Average Intensity of Staining  Strong   Test Type  Food and Drug Administration (FDA) cleared (test / vendor): Roche CONFIRM anti-(ER) (SP1) Rabbit Monoclonal Primary Antibody, Roche Multimer Detection   Primary Antibody  SP1   Scoring System  No separate scoring system used   Test(s) Performed     Progesterone Receptor (PgR) Status  Negative (less than 1%)     Internal control cells present and  stain as expected   Test Type  Food and Drug Administration (FDA) cleared (test / vendor): Roche CONFIRM anti-(NE) (1E2) Rabbit Monoclonal Primary Anitbody, Roche Multimer Detection   Primary Antibody  1E2   Scoring System  No separate scoring system used   Test(s) Performed     HER2 by Immunohistochemistry  Negative (Score 1+)   Test Type  Food and Drug Administration (FDA) cleared (test / vendor): Roche Pathway anti-HER-2/ingrid (4B5) Rabbit Monoclonal Primary Antibody, Roche Multimer Detection   Primary Antibody  4B5   Cold Ischemia and Fixation Times  Meet requirements specified in latest version of the ASCO / CAP Guidelines   Testing Performed on Block Number(s)  A1   METHODS   Fixative  Formalin   Image Analysis  Not performed   Comment(s)  Internal control present.   .      Block for Additional Biomarkers/Molecular Studies  [facility]   Tumor Block: A1       Social History:  Tobacco Use - NO  Do you use any recreational drugs - NO  Alcohol Use - NO  Caffeine Intake - NO  Working - RETIRED  Marital status - SINGLE  Living with - SELF     Past Medical History:   Diagnosis Date    Bipolar disorder, unspecified (Multi) 05/04/2017    Psychosis, bipolar affective    Encounter for gynecological examination (general) (routine) without abnormal findings     Well female exam with routine gynecological exam    History of falling 06/16/2017    History of fall    Other conditions influencing health status 02/09/2016    No significant past medical history    Personal history of colonic polyps 08/22/2019    History of colonic polyps    Personal history of other diseases of the digestive system 02/09/2016    History of gallstones    Personal history of other diseases of the digestive system 02/09/2016    History of hiatal hernia    Personal history of other malignant neoplasm of skin 02/09/2016    History of malignant neoplasm of skin     Family History   Problem Relation Name Age of Onset    Heart disease Mother      Dementia  "Brother       Past Surgical History:   Procedure Laterality Date    APPENDECTOMY  02/09/2016    Appendectomy    BREAST BIOPSY Right 01/28/2025    CHOLECYSTECTOMY  08/22/2019    Cholecystectomy    CT ABDOMEN ANGIOGRAM W AND/OR WO IV CONTRAST  07/27/2013    CT ABDOMEN ANGIOGRAM W AND/OR WO IV CONTRAST [facility]    GASTRIC FUNDOPLICATION  08/22/2019    Esophagogastric Fundoplasty Nissen Fundoplication    HERNIA REPAIR  08/22/2019    Hernia Repair    OTHER SURGICAL HISTORY  12/29/2019    Esophagogastroduodenoscopy    OTHER SURGICAL HISTORY  02/09/2016    Biopsy Skin    TOTAL HIP ARTHROPLASTY  08/22/2019    Total Hip Replacement       No Known Allergies    Review of Systems  BP (!) 117/101 (BP Location: Left arm, Patient Position: Sitting, BP Cuff Size: Adult)   Pulse 99   Temp 36.3 °C (97.4 °F) (Temporal)   Resp 17   Ht 1.549 m (5' 1\")   Wt 68 kg (150 lb)   SpO2 97%   BMI 28.34 kg/m²    Objective   Physical Exam  Right breast biopsy site healed nicely  Assessment/Plan   Problem List Items Addressed This Visit             ICD-10-CM    Invasive ductal carcinoma of breast, female, right - Primary C50.911     Patient with a 5 mm invasive ductal carcinoma of the right breast.  ER positive MD HER2/ingrid negative grade 2 out of 3.  Long discussion was held with the patient concerning her treatment options.  At this time she is agreeable to Magseed localization lumpectomy of the right breast.  A discussion was held concerning doing a sentinel node biopsy.  We both agree that with this small tumor and considering it she would not proceed with chemotherapy we will not be doing a sentinel node biopsy.  Patient was also informed she likely would not be a candidate for radiation therapy.  We did discuss however no current therapy of which she is agreeable.  Risk-benefit alternatives of doing a Magseed localization lumpectomy right breast were thoroughly discussed with the patient agrees to proceed patient is not a " candidate for MRI.  Is not a candidate for genetic testing.         Other chest pain R07.89     Patient read first 2 episodes of chest pain radiating to the neck.  Patient has a cardiologist Dr. Coley.  Recommend she see Dr. Coley prior to surgery.             Breast History:  Patient is new to clinic and presents for Right US breast bx consult, bx 1/28 .  Patient is referred by Dr. Adilene Rod.  Patient had BI Mammo right diagnostic tomosynthesis and BI US breast limited right completed on 1/3/2025. Impression was suspicious group of calcifications with a small associated soft issue density right breast 9 o'clock position 6 cm from the nipple with a maximum diameter 6 mm. 1 lymph node within the right axilla demonstrates an absence of a fatty hilum. Biopsy of this may be appropriate as well. Need for biopsy was discussed with the patient at the time of the study. Due to results order was placed for biopsy and consult with Dr. Wilson per Dr. Rod.  Patient denies any previous breast biopsies.  No palpable masses.  No nipple discharge.  No known family history of breast disease.     BI MAMMO RIGHT DIAGNOSTIC TOMOSYNTHESIS; BI US BREAST LIMITED RIGHT;  1/3/2025 10:17 am; 1/3/2025 11:27 am      ACCESSION NUMBER(S):  [id number]; [id number]      ORDERING CLINICIAN:  ADILENE ROD      INDICATION:  Follow-up calcifications right breast.      ,R92.8 Other abnormal and inconclusive findings on diagnostic imaging  of breast      COMPARISON:  2024, 2022, 2019, 2017      FINDINGS:  MAMMOGRAPHY: 2D and tomosynthesis images were reviewed at 1 mm slice  thickness.      Density:  There are scattered areas of fibroglandular density.      There is a cluster of calcifications right breast at approximately  the 9 o'clock position, pleomorphic in appearance on the current exam  with the overall cluster measuring approximately 7 mm in diameter.  There is an increase in the number and pleomorphism of these  calcifications  when compared to the prior study.      ULTRASOUND: Targeted ultrasound was performed of the right breast by  a registered sonographer with elastography. There is a small, 3 x 4 x  5 mm hypoechoic solid-appearing nodule with microcalcifications with  some internal vascularity corresponding with the mammographic density  described above, located at the 9 o'clock position right breast with  distance from nipple of 6 cm. There is a small lymph node identified  within the right axilla which does not demonstrate a fatty hilum with  this lymph node measuring 3 x 5 x 6 mm. Biopsy of this lymph node  along with the area of calcifications with associated small soft  tissue density is suggested.       Impression   Suspicious group of calcifications with a small associated soft  tissue density right breast 9 o'clock position 6 cm from the nipple  with a maximum diameter 6 mm. 1 lymph node within the right axilla  demonstrates an absence of a fatty hilum. Biopsy of this may be  appropriate as well. Need for biopsy was discussed with the patient  at the time of the study.      BI-RADS CATEGORY:  BI-RADS Category:  4 Suspicious.  Recommendation:  Surgical Consultation and Biopsy.  Recommended Date:  Immediate.  Laterality:  Right.    MACRO:  Critical Finding:  See findings. Notification was initiated on  1/3/2025 at 12:16 pm by  Jed Rosario.  (**-YCF-**) Instructions:  See Impression for specific recommendations.   Patient with increasingly abnormal microcalcifications right breast 9 o'clock position that on ultrasound is a 5 mm lesion. Recommend ultrasound-guided core biopsy.Patient to be scheduled for ultrasound guided biopsy of the breast in the radiology department. Risk, benefits and alternatives to this plan were thoroughly discussed with the patient who agrees to proceed. The procedure was also thoroughly discussed as well as her follow-up. She is to see me in the office in one week but I also will call as soon as I see  the pathology which is usually 4 working days from procedure.   Patient with a right axillary lymph node with loss of fatty hilum.  Recommend ultrasound-guided core biopsy of the lymph node iat the same time as the breast biopsy.  Previous Biopsy: NO Menarche Age: 14 Age of first delivery: N/A Breastfeed: N/A Menopause age: LATE 40S HRT: NO Family history of breast cancer: NO Family history of ovarian cancer: NO Family history of pancreatic cancer: NO G 0 P 0     Liv Wilson MD 02/13/25 1:20 PM

## 2025-02-07 ENCOUNTER — TELEPHONE (OUTPATIENT)
Dept: SURGERY | Facility: CLINIC | Age: 85
End: 2025-02-07
Payer: MEDICARE

## 2025-02-13 ENCOUNTER — OFFICE VISIT (OUTPATIENT)
Dept: SURGERY | Facility: CLINIC | Age: 85
End: 2025-02-13
Payer: MEDICARE

## 2025-02-13 VITALS
HEIGHT: 61 IN | WEIGHT: 150 LBS | DIASTOLIC BLOOD PRESSURE: 101 MMHG | TEMPERATURE: 97.4 F | OXYGEN SATURATION: 97 % | BODY MASS INDEX: 28.32 KG/M2 | SYSTOLIC BLOOD PRESSURE: 117 MMHG | HEART RATE: 99 BPM | RESPIRATION RATE: 17 BRPM

## 2025-02-13 DIAGNOSIS — C50.911 INVASIVE DUCTAL CARCINOMA OF BREAST, FEMALE, RIGHT: Primary | ICD-10-CM

## 2025-02-13 DIAGNOSIS — R07.89 OTHER CHEST PAIN: ICD-10-CM

## 2025-02-13 PROCEDURE — 1123F ACP DISCUSS/DSCN MKR DOCD: CPT | Performed by: SURGERY

## 2025-02-13 PROCEDURE — 99214 OFFICE O/P EST MOD 30 MIN: CPT | Performed by: SURGERY

## 2025-02-13 PROCEDURE — 1036F TOBACCO NON-USER: CPT | Performed by: SURGERY

## 2025-02-13 PROCEDURE — 1126F AMNT PAIN NOTED NONE PRSNT: CPT | Performed by: SURGERY

## 2025-02-13 PROCEDURE — 1159F MED LIST DOCD IN RCRD: CPT | Performed by: SURGERY

## 2025-02-13 ASSESSMENT — COLUMBIA-SUICIDE SEVERITY RATING SCALE - C-SSRS
6. HAVE YOU EVER DONE ANYTHING, STARTED TO DO ANYTHING, OR PREPARED TO DO ANYTHING TO END YOUR LIFE?: NO
1. IN THE PAST MONTH, HAVE YOU WISHED YOU WERE DEAD OR WISHED YOU COULD GO TO SLEEP AND NOT WAKE UP?: NO
2. HAVE YOU ACTUALLY HAD ANY THOUGHTS OF KILLING YOURSELF?: NO

## 2025-02-13 ASSESSMENT — LIFESTYLE VARIABLES
HOW MANY STANDARD DRINKS CONTAINING ALCOHOL DO YOU HAVE ON A TYPICAL DAY: PATIENT DOES NOT DRINK
HOW OFTEN DO YOU HAVE SIX OR MORE DRINKS ON ONE OCCASION: NEVER
HOW OFTEN DO YOU HAVE A DRINK CONTAINING ALCOHOL: NEVER
SKIP TO QUESTIONS 9-10: 1
AUDIT-C TOTAL SCORE: 0

## 2025-02-13 ASSESSMENT — PATIENT HEALTH QUESTIONNAIRE - PHQ9
1. LITTLE INTEREST OR PLEASURE IN DOING THINGS: NOT AT ALL
2. FEELING DOWN, DEPRESSED OR HOPELESS: NOT AT ALL
SUM OF ALL RESPONSES TO PHQ9 QUESTIONS 1 & 2: 0

## 2025-02-13 ASSESSMENT — ENCOUNTER SYMPTOMS
OCCASIONAL FEELINGS OF UNSTEADINESS: 0
LOSS OF SENSATION IN FEET: 0
DEPRESSION: 0

## 2025-02-13 ASSESSMENT — PAIN SCALES - GENERAL: PAINLEVEL_OUTOF10: 0-NO PAIN

## 2025-02-13 NOTE — ASSESSMENT & PLAN NOTE
Patient read first 2 episodes of chest pain radiating to the neck.  Patient has a cardiologist Dr. Coley.  Recommend she see Dr. Coley prior to surgery.

## 2025-02-13 NOTE — ASSESSMENT & PLAN NOTE
Patient with a 5 mm invasive ductal carcinoma of the right breast.  ER positive MD HER2/ingrid negative grade 2 out of 3.  Long discussion was held with the patient concerning her treatment options.  At this time she is agreeable to Magseed localization lumpectomy of the right breast.  A discussion was held concerning doing a sentinel node biopsy.  We both agree that with this small tumor and considering it she would not proceed with chemotherapy we will not be doing a sentinel node biopsy.  Patient was also informed she likely would not be a candidate for radiation therapy.  We did discuss however no current therapy of which she is agreeable.  Risk-benefit alternatives of doing a Magseed localization lumpectomy right breast were thoroughly discussed with the patient agrees to proceed patient is not a candidate for MRI.  Is not a candidate for genetic testing.

## 2025-02-18 DIAGNOSIS — C50.911 INVASIVE DUCTAL CARCINOMA OF BREAST, FEMALE, RIGHT: ICD-10-CM

## 2025-02-20 ENCOUNTER — OFFICE VISIT (OUTPATIENT)
Dept: CARDIOLOGY | Facility: CLINIC | Age: 85
End: 2025-02-20
Payer: MEDICARE

## 2025-02-20 VITALS
HEART RATE: 79 BPM | BODY MASS INDEX: 30 KG/M2 | DIASTOLIC BLOOD PRESSURE: 85 MMHG | OXYGEN SATURATION: 98 % | SYSTOLIC BLOOD PRESSURE: 155 MMHG | WEIGHT: 163 LBS | HEIGHT: 62 IN

## 2025-02-20 DIAGNOSIS — R07.9 CHEST PAIN, UNSPECIFIED TYPE: Primary | ICD-10-CM

## 2025-02-20 DIAGNOSIS — R93.1 ABNORMAL HEART SCORE CT: ICD-10-CM

## 2025-02-20 PROCEDURE — 99214 OFFICE O/P EST MOD 30 MIN: CPT | Performed by: NURSE PRACTITIONER

## 2025-02-20 PROCEDURE — 1159F MED LIST DOCD IN RCRD: CPT | Performed by: NURSE PRACTITIONER

## 2025-02-20 PROCEDURE — 1036F TOBACCO NON-USER: CPT | Performed by: NURSE PRACTITIONER

## 2025-02-20 PROCEDURE — G2211 COMPLEX E/M VISIT ADD ON: HCPCS | Performed by: NURSE PRACTITIONER

## 2025-02-20 PROCEDURE — 1160F RVW MEDS BY RX/DR IN RCRD: CPT | Performed by: NURSE PRACTITIONER

## 2025-02-20 PROCEDURE — 93005 ELECTROCARDIOGRAM TRACING: CPT | Performed by: NURSE PRACTITIONER

## 2025-02-20 PROCEDURE — 1123F ACP DISCUSS/DSCN MKR DOCD: CPT | Performed by: NURSE PRACTITIONER

## 2025-02-20 PROCEDURE — 93010 ELECTROCARDIOGRAM REPORT: CPT | Performed by: INTERNAL MEDICINE

## 2025-02-20 RX ORDER — METOPROLOL SUCCINATE 50 MG/1
50 TABLET, EXTENDED RELEASE ORAL DAILY
Qty: 90 TABLET | Refills: 3 | Status: SHIPPED | OUTPATIENT
Start: 2025-02-20 | End: 2026-02-20

## 2025-02-20 RX ORDER — REGADENOSON 0.08 MG/ML
0.4 INJECTION, SOLUTION INTRAVENOUS
OUTPATIENT
Start: 2025-02-20

## 2025-02-20 RX ORDER — ROSUVASTATIN CALCIUM 20 MG/1
20 TABLET, COATED ORAL DAILY
Qty: 90 TABLET | Refills: 3 | Status: SHIPPED | OUTPATIENT
Start: 2025-02-20 | End: 2026-02-20

## 2025-02-20 RX ORDER — AMINOPHYLLINE 25 MG/ML
125 INJECTION, SOLUTION INTRAVENOUS ONCE AS NEEDED
OUTPATIENT
Start: 2025-02-20

## 2025-02-20 ASSESSMENT — PATIENT HEALTH QUESTIONNAIRE - PHQ9
2. FEELING DOWN, DEPRESSED OR HOPELESS: NOT AT ALL
SUM OF ALL RESPONSES TO PHQ9 QUESTIONS 1 AND 2: 0
1. LITTLE INTEREST OR PLEASURE IN DOING THINGS: NOT AT ALL

## 2025-02-20 ASSESSMENT — ENCOUNTER SYMPTOMS
RESPIRATORY NEGATIVE: 1
NEUROLOGICAL NEGATIVE: 1
CONSTITUTIONAL NEGATIVE: 1
MUSCULOSKELETAL NEGATIVE: 1
GASTROINTESTINAL NEGATIVE: 1

## 2025-02-20 NOTE — PROGRESS NOTES
"Chief Complaint:   Chest pain    History Of Present Illness:    .Ms Samson returns in follow up.  Has not been seen since 2022.  Denies sob, palpitations or pedal edema. Had a 5-10 minute episode of chest pain across the chest and into the neck a few weeks ago while sitting.  Per Dr Coley, stress testing vs card cath.           Last Recorded Vitals:  Blood pressure 155/85, pulse 79, height 1.575 m (5' 2\"), weight 73.9 kg (163 lb), SpO2 98%.     Past Medical History:  Past Medical History:   Diagnosis Date    Bipolar disorder, unspecified (Multi) 05/04/2017    Psychosis, bipolar affective    Encounter for gynecological examination (general) (routine) without abnormal findings     Well female exam with routine gynecological exam    History of falling 06/16/2017    History of fall    Other conditions influencing health status 02/09/2016    No significant past medical history    Personal history of colonic polyps 08/22/2019    History of colonic polyps    Personal history of other diseases of the digestive system 02/09/2016    History of gallstones    Personal history of other diseases of the digestive system 02/09/2016    History of hiatal hernia    Personal history of other malignant neoplasm of skin 02/09/2016    History of malignant neoplasm of skin        Past Surgical History:  Past Surgical History:   Procedure Laterality Date    APPENDECTOMY  02/09/2016    Appendectomy    BREAST BIOPSY Right 01/28/2025    CHOLECYSTECTOMY  08/22/2019    Cholecystectomy    CT ABDOMEN ANGIOGRAM W AND/OR WO IV CONTRAST  07/27/2013    CT ABDOMEN ANGIOGRAM W AND/OR WO IV CONTRAST [facility]    GASTRIC FUNDOPLICATION  08/22/2019    Esophagogastric Fundoplasty Nissen Fundoplication    HERNIA REPAIR  08/22/2019    Hernia Repair    OTHER SURGICAL HISTORY  12/29/2019    Esophagogastroduodenoscopy    OTHER SURGICAL HISTORY  02/09/2016    Biopsy Skin    TOTAL HIP ARTHROPLASTY  08/22/2019    Total Hip Replacement       Social " History:  Social History     Socioeconomic History    Marital status:    Tobacco Use    Smoking status: Never     Passive exposure: Never    Smokeless tobacco: Never   Vaping Use    Vaping status: Never Used   Substance and Sexual Activity    Alcohol use: Not Currently    Drug use: Never       Family History:  Family History   Problem Relation Name Age of Onset    Heart disease Mother      Dementia Brother           Allergies:  Patient has no known allergies.    Outpatient Medications:  Current Outpatient Medications   Medication Sig Dispense Refill    acetaminophen (Tylenol) 325 mg tablet Take 1 tablet (325 mg) by mouth every 6 hours if needed for mild pain (1 - 3).      ARIPiprazole (Abilify) 2 mg tablet Take 1 tablet (2 mg) by mouth once daily.      aspirin 81 mg EC tablet Take 1 tablet (81 mg) by mouth once daily.      buPROPion XL (Wellbutrin XL) 300 mg 24 hr tablet Take 1 tablet (300 mg) by mouth once daily in the morning. Take before meals.      lamoTRIgine (LaMICtal) 100 mg tablet Take 1 tablet (100 mg) by mouth. Take 1/2 tab in AM and 2 tabs in PM.      multivitamin tablet Take 1 tablet by mouth once daily.      QUEtiapine (SEROquel) 100 mg tablet Take 1 tablet (100 mg) by mouth once daily at bedtime.       No current facility-administered medications for this visit.        Physical Exam:  Cardiovascular:      PMI at left midclavicular line. Normal rate. Regular rhythm. Normal S1. Normal S2.       Murmurs: There is no murmur.      No gallop.  No click. No rub.   Pulses:     Intact distal pulses.   Edema:     Peripheral edema absent.         ROS:  Review of Systems   Constitutional: Negative.   Cardiovascular:  Positive for chest pain.   Respiratory: Negative.     Skin: Negative.    Musculoskeletal: Negative.    Gastrointestinal: Negative.    Genitourinary: Negative.    Neurological: Negative.           Last Labs:  CBC -  Lab Results   Component Value Date    WBC 5.9 03/12/2024    HGB 14.3 03/12/2024     HCT 45.0 03/12/2024    MCV 87 03/12/2024     03/12/2024       CMP -  Lab Results   Component Value Date    CALCIUM 9.9 03/12/2024    PROT 7.1 03/12/2024    ALBUMIN 4.2 03/12/2024    AST 22 03/12/2024    ALT 20 03/12/2024    ALKPHOS 88 03/12/2024    BILITOT 0.5 03/12/2024       LIPID PANEL -   Lab Results   Component Value Date    CHOL 219 (H) 03/12/2024    TRIG 180 (H) 03/12/2024    HDL 54.2 03/12/2024    CHHDL 4.0 03/12/2024    LDLF 106 (H) 04/14/2022    VLDL 36 03/12/2024    NHDL 165 (H) 03/12/2024       RENAL FUNCTION PANEL -   Lab Results   Component Value Date    GLUCOSE 106 (H) 03/12/2024     03/12/2024    K 4.1 03/12/2024     03/12/2024    CO2 28 03/12/2024    ANIONGAP 11 03/12/2024    BUN 16 03/12/2024    CREATININE 0.98 03/12/2024    CALCIUM 9.9 03/12/2024    ALBUMIN 4.2 03/12/2024        Lab Results   Component Value Date    HGBA1C 5.3 09/18/2019         Assessment/Plan   Problem List Items Addressed This Visit    None    Assessment:     1. Coronary artery disease. The patient is a very pleasant 82-year-old white female whose past medical history is negative for hypertension diabetes and hyperlipidemia. Her most recent lipid panel included a cholesterol of 170  HDL 47 triglyceride 85. She also has been a lifetime non-smoker. Recently however the patient did have a CT coronary calcium score performed on 6/13/2022 with a total value of 718 placing her in the higher risk category. The patient denies any anginal type chest discomfort. Her EKG shows sinus rhythm low QRS voltage and poor R wave progression in precordial plane. Given the findings of her CT coronary calcium score she will be started on low-dose aspirin 81 mg daily and atorvastatin 20 mg daily. She had a negative lexiscan done 11/2022. She will be scheduled for a pharmacological nuclear stress test in the near future.     2. Minimal hyperlipidemia. As noted above the patient's untreated lipid panel is satisfactory under  most situations. However given her elevated CT coronary calcium score she will be started on atorvastatin 20 mg daily.     3. S/p bilateral total hip replacements.     4. Status post hiatal hernia repair.     5. Ascending thoracic aortic aneurysm. The patient's CT coronary calcium score also demonstrated mild aneurysmal dilatation of the ascending thoracic aorta measuring 4.3 cm. Mild cardiomegaly also noted. Patient's will also be scheduled to have a echocardiogram.          Chantell Silva, APRN-CNP

## 2025-02-22 LAB
ATRIAL RATE: 68 BPM
P AXIS: 46 DEGREES
P OFFSET: 195 MS
P ONSET: 130 MS
PR INTERVAL: 184 MS
Q ONSET: 222 MS
QRS COUNT: 11 BEATS
QRS DURATION: 76 MS
QT INTERVAL: 426 MS
QTC CALCULATION(BAZETT): 452 MS
QTC FREDERICIA: 444 MS
R AXIS: 15 DEGREES
T AXIS: 58 DEGREES
T OFFSET: 435 MS
VENTRICULAR RATE: 68 BPM

## 2025-02-24 NOTE — PROGRESS NOTES
Subjective   Reason for Visit: Nicole Samson is an 85 y.o. female here for a Medicare Wellness visit.     Past Medical, Surgical, and Family History reviewed and updated in chart.    Reviewed all medications by prescribing practitioner or clinical pharmacist (such as prescriptions, OTCs, herbal therapies and supplements) and documented in the medical record.    HPI    Patient Care Team:  Adilene Corrales MD as PCP - General (Family Medicine)  Adilene Corrales MD as PCP - [employer] PCP   Nicole presents for a CPE.  She was seen as a new patient last year.  She was last seen in May.  She lives alone. She does not have family in the area but does have friends. She does not drive but has friends who drives her and she takes public transportation. She does not have a smoking history.  She had a coronary calcium score in 2022 of 718.  Subsequently she had a normal nuclear stress test.  She recently saw cardiologist for chest pain and is scheduled for a nuclear stress test and an echocardiogram.  She was started on atorvastatin 20 mg and metoprolol.  This was just 2 days ago and she has not yet picked up those prescriptions.  She was also recommended to take a baby aspirin each day.  She has had no recent falls.  She does notice some imbalance.  She has gone through physical therapy after her hip replacement a few years ago.  She walks with a cane when she is not at home.  She has bumped into things and gotten bruises but not fallen.  She was diagnosed with ductal carcinoma in situ earlier this year after not having a mammogram for 4 years.  She is seeing Dr. Wilson.  She has impaired fasting blood sugar.  Blood work was ordered but not completed.  She had an unremarkable DEXA scan in 2024.  Consultants:   Psychiatry for bipolar disorder.  Ophthalmologist for diagnosis of macular degeneration.  Dr. Wilson for breast cancer  Cardiologist for chest pain.    She wonders if she has shingles because she  has had some vague discomfort and itching across her lower back.  It is gone from her right side to her left side.  No rash that she is aware of.  She has had her shingles vaccine.      Review of Systems  Constitutional Symptoms: She is negative for headaches, fatigue, sleep disturbance.   Cardiovascular: She is negative for chest pain, palpitations.  Respiratory: She is negative for shortness of breath, dyspnea on exertion, coughing.   Gastrointestinal: She is negative for indigestion, abdominal pain, change in bowel habits, blood in stool.   Female Genitourinary: She is negative for nocturia.  She is negative for frequency.   Musculoskeletal: She is negative for myalgias, cramps.   Integumentary: She is negative for skin trouble or rash, non-healing skin lesion.   Neurological: She is negative for numbness, tingling   Endocrine: She is negative for heat or cold intolerance, polyuria, polydipsia, tremor.     Objective   Vitals:  /80   Pulse 68   Temp 36.8 °C (98.2 °F)   Ht 1.524 m (5')   Wt 68.5 kg (151 lb)   SpO2 98%   BMI 29.49 kg/m²       Physical Exam  She is alert and in no acute distress  Eyes PERRL, EOMI.  Ears TMs are clear.  She does not have her hearing aids in.  Nose is noncongested.  Throat is noninjected and without exudate.  Neck is supple without adenopathy.  No carotid bruits.  Lungs are clear to auscultation.  Heart is regular in rhythm and rate.  Normal S1 and S2 without murmurs or gallops.  Extremities are without edema.  Abdomen is soft and nontender.  No rebound guarding or masses.  Musculoskeletal is intact.  She has a slight kyphosis of her back. No joint inflammation.  Skin shows normal turgor.  No rash on her back.  She does have a couple bruises below her shoulders posteriorly.  Neuro is intact and nonfocal.  Mood and affect are healthy.       Assessment & Plan  Well adult exam  Diet and activity was briefly discussed.  I recommend she take Tylenol for any discomfort in her  back.  She was reassured that she does not have shingles.  She will continue to follow with her consultants.  She will get fasting blood work done within the next week or 2.  I will notify her of those results.  She should follow-up in 1 year or as needed sooner.       Impaired fasting blood sugar    Orders:    CBC and Auto Differential; Future    Comprehensive Metabolic Panel; Future    Hemoglobin A1C; Future

## 2025-02-25 ENCOUNTER — APPOINTMENT (OUTPATIENT)
Dept: PRIMARY CARE | Facility: CLINIC | Age: 85
End: 2025-02-25
Payer: MEDICARE

## 2025-02-25 ENCOUNTER — TELEPHONE (OUTPATIENT)
Dept: PRIMARY CARE | Facility: CLINIC | Age: 85
End: 2025-02-25

## 2025-02-25 VITALS
BODY MASS INDEX: 29.64 KG/M2 | HEIGHT: 60 IN | TEMPERATURE: 98.2 F | OXYGEN SATURATION: 98 % | WEIGHT: 151 LBS | SYSTOLIC BLOOD PRESSURE: 126 MMHG | HEART RATE: 68 BPM | DIASTOLIC BLOOD PRESSURE: 80 MMHG

## 2025-02-25 DIAGNOSIS — Z00.00 WELL ADULT EXAM: Primary | ICD-10-CM

## 2025-02-25 DIAGNOSIS — E78.5 DYSLIPIDEMIA: ICD-10-CM

## 2025-02-25 DIAGNOSIS — R73.01 IMPAIRED FASTING BLOOD SUGAR: ICD-10-CM

## 2025-02-25 DIAGNOSIS — I10 PRIMARY HYPERTENSION: ICD-10-CM

## 2025-02-25 PROCEDURE — 1126F AMNT PAIN NOTED NONE PRSNT: CPT | Performed by: FAMILY MEDICINE

## 2025-02-25 PROCEDURE — 1160F RVW MEDS BY RX/DR IN RCRD: CPT | Performed by: FAMILY MEDICINE

## 2025-02-25 PROCEDURE — 1158F ADVNC CARE PLAN TLK DOCD: CPT | Performed by: FAMILY MEDICINE

## 2025-02-25 PROCEDURE — 1123F ACP DISCUSS/DSCN MKR DOCD: CPT | Performed by: FAMILY MEDICINE

## 2025-02-25 PROCEDURE — 1170F FXNL STATUS ASSESSED: CPT | Performed by: FAMILY MEDICINE

## 2025-02-25 PROCEDURE — 1159F MED LIST DOCD IN RCRD: CPT | Performed by: FAMILY MEDICINE

## 2025-02-25 PROCEDURE — G0439 PPPS, SUBSEQ VISIT: HCPCS | Performed by: FAMILY MEDICINE

## 2025-02-25 PROCEDURE — 1036F TOBACCO NON-USER: CPT | Performed by: FAMILY MEDICINE

## 2025-02-25 ASSESSMENT — PATIENT HEALTH QUESTIONNAIRE - PHQ9
1. LITTLE INTEREST OR PLEASURE IN DOING THINGS: NOT AT ALL
2. FEELING DOWN, DEPRESSED OR HOPELESS: NOT AT ALL
SUM OF ALL RESPONSES TO PHQ9 QUESTIONS 1 AND 2: 0

## 2025-02-25 ASSESSMENT — ACTIVITIES OF DAILY LIVING (ADL)
GROCERY_SHOPPING: INDEPENDENT
MANAGING_FINANCES: INDEPENDENT
DOING_HOUSEWORK: INDEPENDENT
BATHING: INDEPENDENT
TAKING_MEDICATION: INDEPENDENT
DRESSING: INDEPENDENT

## 2025-02-25 ASSESSMENT — COLUMBIA-SUICIDE SEVERITY RATING SCALE - C-SSRS: 1. IN THE PAST MONTH, HAVE YOU WISHED YOU WERE DEAD OR WISHED YOU COULD GO TO SLEEP AND NOT WAKE UP?: NO

## 2025-02-25 ASSESSMENT — PAIN SCALES - GENERAL: PAINLEVEL_OUTOF10: 0-NO PAIN

## 2025-02-25 NOTE — PATIENT INSTRUCTIONS
It has been a pleasure to be your physician for short period of time.  Please get fasting blood work done at your earliest convenience.  This means nothing to eat or drink after 9:00 the night before.  You should continue to follow with Dr. Wilson for your breast cancer and your cardiologist for your chest pain.  I suggest to continue to use Lubriderm for any itching that you may have.  You have no evidence of shingles.  I suggest taking Tylenol as needed for any discomfort.

## 2025-02-27 ENCOUNTER — HOSPITAL ENCOUNTER (OUTPATIENT)
Dept: RADIOLOGY | Facility: HOSPITAL | Age: 85
Discharge: HOME | End: 2025-02-27
Payer: MEDICARE

## 2025-02-27 DIAGNOSIS — C50.911 INVASIVE DUCTAL CARCINOMA OF BREAST, FEMALE, RIGHT: ICD-10-CM

## 2025-02-27 PROCEDURE — A4648 IMPLANTABLE TISSUE MARKER: HCPCS

## 2025-02-27 PROCEDURE — 2780000003 HC OR 278 NO HCPCS

## 2025-02-27 PROCEDURE — 19285 PERQ DEV BREAST 1ST US IMAG: CPT | Mod: RIGHT SIDE | Performed by: RADIOLOGY

## 2025-02-27 PROCEDURE — 19285 PERQ DEV BREAST 1ST US IMAG: CPT | Mod: RT

## 2025-02-27 PROCEDURE — 2500000004 HC RX 250 GENERAL PHARMACY W/ HCPCS (ALT 636 FOR OP/ED): Performed by: RADIOLOGY

## 2025-02-27 PROCEDURE — 77065 DX MAMMO INCL CAD UNI: CPT | Mod: RIGHT SIDE | Performed by: RADIOLOGY

## 2025-02-27 RX ORDER — LIDOCAINE HYDROCHLORIDE 10 MG/ML
10 INJECTION, SOLUTION EPIDURAL; INFILTRATION; INTRACAUDAL; PERINEURAL ONCE
Status: COMPLETED | OUTPATIENT
Start: 2025-02-27 | End: 2025-02-27

## 2025-02-27 RX ADMIN — LIDOCAINE HYDROCHLORIDE 2 ML: 10 INJECTION, SOLUTION EPIDURAL; INFILTRATION; INTRACAUDAL; PERINEURAL at 10:07

## 2025-02-27 ASSESSMENT — PAIN SCALES - GENERAL
PAINLEVEL_OUTOF10: 0 - NO PAIN
PAINLEVEL_OUTOF10: 4
PAINLEVEL_OUTOF10: 0 - NO PAIN

## 2025-02-28 ENCOUNTER — HOSPITAL ENCOUNTER (OUTPATIENT)
Dept: RADIOLOGY | Facility: HOSPITAL | Age: 85
Discharge: HOME | End: 2025-02-28
Payer: MEDICARE

## 2025-02-28 ENCOUNTER — HOSPITAL ENCOUNTER (OUTPATIENT)
Dept: CARDIOLOGY | Facility: HOSPITAL | Age: 85
Discharge: HOME | End: 2025-02-28
Payer: MEDICARE

## 2025-02-28 DIAGNOSIS — R07.9 CHEST PAIN, UNSPECIFIED TYPE: ICD-10-CM

## 2025-02-28 PROCEDURE — 3430000001 HC RX 343 DIAGNOSTIC RADIOPHARMACEUTICALS: Performed by: NURSE PRACTITIONER

## 2025-02-28 PROCEDURE — 78452 HT MUSCLE IMAGE SPECT MULT: CPT

## 2025-02-28 PROCEDURE — 93017 CV STRESS TEST TRACING ONLY: CPT

## 2025-02-28 PROCEDURE — A9502 TC99M TETROFOSMIN: HCPCS | Performed by: NURSE PRACTITIONER

## 2025-02-28 PROCEDURE — 2500000004 HC RX 250 GENERAL PHARMACY W/ HCPCS (ALT 636 FOR OP/ED): Performed by: NURSE PRACTITIONER

## 2025-02-28 RX ORDER — REGADENOSON 0.08 MG/ML
0.4 INJECTION, SOLUTION INTRAVENOUS
Status: COMPLETED | OUTPATIENT
Start: 2025-02-28 | End: 2025-02-28

## 2025-02-28 RX ADMIN — TETROFOSMIN 35.1 MILLICURIE: 0.23 INJECTION, POWDER, LYOPHILIZED, FOR SOLUTION INTRAVENOUS at 08:26

## 2025-02-28 RX ADMIN — TETROFOSMIN 11 MILLICURIE: 0.23 INJECTION, POWDER, LYOPHILIZED, FOR SOLUTION INTRAVENOUS at 07:30

## 2025-02-28 RX ADMIN — REGADENOSON 0.4 MG: 0.08 INJECTION, SOLUTION INTRAVENOUS at 08:38

## 2025-03-04 ENCOUNTER — TELEPHONE (OUTPATIENT)
Dept: CARDIOLOGY | Facility: CLINIC | Age: 85
End: 2025-03-04
Payer: MEDICARE

## 2025-03-04 NOTE — TELEPHONE ENCOUNTER
----- Message from Chantell Silva sent at 3/3/2025 11:40 AM EST -----  Please call patient with results.  Stress testing was negative.

## 2025-03-04 NOTE — TELEPHONE ENCOUNTER
Result Communication    Resulted Orders   Nuclear Stress Test    Narrative    Interpreted By:  Yariel Denney and Booth Cameron   STUDY:  NUCLEAR STRESS TEST; 2/28/2025 9:27 am      INDICATION:  Signs/Symptoms:CP.      ,R07.9 Chest pain, unspecified      COMPARISON:  NM stress test 11/28/2022      ACCESSION NUMBER(S):  [id number]      ORDERING CLINICIAN:  JAMESON WEST      TECHNIQUE:  DIVISION OF NUCLEAR MEDICINE  STRESS MYOCARDIAL PERFUSION SCAN, ONE DAY PROTOCOL      The patient received an intravenous dose of  11 mCi of Tc-99m  Myoview and resting emission tomographic (SPECT) images of the  myocardium were acquired. The patient then received an intravenous  infusion of 0.4mg regadenoson (Lexiscan)  followed by an additional  dose of  35.1 mCi of Tc-99m  Myoview. Stress phase SPECT images of  the myocardium were then acquired. These included ECG-gated images to  assess and quantify ventricular function.      Limiting Factors: None      FINDINGS:  Quality of the Study: Good      Technical Limitations/Significant Artifacts: Diaphragmatic attenuation      Cardiac Imaging: Images show no fixed or reversible perfusion defects  throughout the visualized cardiac walls.      Left ventricular size (EDV): <60 mL (normal less than 120 mL)      Gated images: Normal wall motion and thickening. Left ventricular  ejection fraction estimated at >65% (normal 45% or greater)      Other Significant Findings:None        Impression    1. No evidence of stress-induced ischemia or prior infarction.  2. The left ventricle is normal in size.  3. Normal LV wall motion with an LV EF estimated at greater than 65%.      I personally reviewed the images/study and I agree with the findings  as stated. This study was interpreted at [place], [place].      MACRO:  None          Signed by: Yariel Denney 2/28/2025 9:40 AM  Dictation workstation:   [id number]       10:31 AM      Results were  successfully communicated with the patient and they acknowledged their understanding.

## 2025-03-05 ENCOUNTER — TELEPHONE (OUTPATIENT)
Dept: CARDIOLOGY | Facility: CLINIC | Age: 85
End: 2025-03-05
Payer: MEDICARE

## 2025-03-24 ENCOUNTER — HOSPITAL ENCOUNTER (OUTPATIENT)
Dept: CARDIOLOGY | Facility: CLINIC | Age: 85
Discharge: HOME | End: 2025-03-24
Payer: MEDICARE

## 2025-03-24 DIAGNOSIS — I71.9 AORTIC ANEURYSM OF UNSPECIFIED SITE, WITHOUT RUPTURE: ICD-10-CM

## 2025-03-24 DIAGNOSIS — R07.9 CHEST PAIN, UNSPECIFIED TYPE: ICD-10-CM

## 2025-03-24 PROCEDURE — 93306 TTE W/DOPPLER COMPLETE: CPT | Performed by: INTERNAL MEDICINE

## 2025-03-24 PROCEDURE — 93306 TTE W/DOPPLER COMPLETE: CPT

## 2025-03-25 LAB
AORTIC VALVE PEAK VELOCITY: 1.34 M/S
AV PEAK GRADIENT: 7 MMHG
AVA (PEAK VEL): 2.7 CM2
EJECTION FRACTION APICAL 4 CHAMBER: 71
EJECTION FRACTION: 63 %
LEFT ATRIUM VOLUME AREA LENGTH INDEX BSA: 33.8 ML/M2
LEFT VENTRICLE INTERNAL DIMENSION DIASTOLE: 3.24 CM (ref 3.5–6)
LEFT VENTRICULAR OUTFLOW TRACT DIAMETER: 1.99 CM
MITRAL VALVE E/A RATIO: 0.62
RIGHT VENTRICLE FREE WALL PEAK S': 12.32 CM/S
RIGHT VENTRICLE PEAK SYSTOLIC PRESSURE: 23.9 MMHG
TRICUSPID ANNULAR PLANE SYSTOLIC EXCURSION: 1.5 CM

## 2025-03-27 LAB
ALBUMIN SERPL-MCNC: 4.1 G/DL (ref 3.6–5.1)
ALP SERPL-CCNC: 90 U/L (ref 37–153)
ALT SERPL-CCNC: 14 U/L (ref 6–29)
ANION GAP SERPL CALCULATED.4IONS-SCNC: 6 MMOL/L (CALC) (ref 7–17)
AST SERPL-CCNC: 18 U/L (ref 10–35)
BASOPHILS # BLD AUTO: 48 CELLS/UL (ref 0–200)
BASOPHILS NFR BLD AUTO: 0.9 %
BILIRUB SERPL-MCNC: 0.4 MG/DL (ref 0.2–1.2)
BUN SERPL-MCNC: 15 MG/DL (ref 7–25)
CALCIUM SERPL-MCNC: 9.6 MG/DL (ref 8.6–10.4)
CHLORIDE SERPL-SCNC: 105 MMOL/L (ref 98–110)
CHOLEST SERPL-MCNC: 193 MG/DL
CHOLEST/HDLC SERPL: 3.7 (CALC)
CO2 SERPL-SCNC: 29 MMOL/L (ref 20–32)
CREAT SERPL-MCNC: 0.93 MG/DL (ref 0.6–0.95)
EGFRCR SERPLBLD CKD-EPI 2021: 61 ML/MIN/1.73M2
EOSINOPHIL # BLD AUTO: 127 CELLS/UL (ref 15–500)
EOSINOPHIL NFR BLD AUTO: 2.4 %
ERYTHROCYTE [DISTWIDTH] IN BLOOD BY AUTOMATED COUNT: 12.8 % (ref 11–15)
EST. AVERAGE GLUCOSE BLD GHB EST-MCNC: 111 MG/DL
EST. AVERAGE GLUCOSE BLD GHB EST-SCNC: 6.2 MMOL/L
GLUCOSE SERPL-MCNC: 105 MG/DL (ref 65–99)
HBA1C MFR BLD: 5.5 % OF TOTAL HGB
HCT VFR BLD AUTO: 44 % (ref 35–45)
HDLC SERPL-MCNC: 52 MG/DL
HGB BLD-MCNC: 14.4 G/DL (ref 11.7–15.5)
LDLC SERPL CALC-MCNC: 116 MG/DL (CALC)
LYMPHOCYTES # BLD AUTO: 1526 CELLS/UL (ref 850–3900)
LYMPHOCYTES NFR BLD AUTO: 28.8 %
MCH RBC QN AUTO: 27.5 PG (ref 27–33)
MCHC RBC AUTO-ENTMCNC: 32.7 G/DL (ref 32–36)
MCV RBC AUTO: 84 FL (ref 80–100)
MONOCYTES # BLD AUTO: 376 CELLS/UL (ref 200–950)
MONOCYTES NFR BLD AUTO: 7.1 %
NEUTROPHILS # BLD AUTO: 3222 CELLS/UL (ref 1500–7800)
NEUTROPHILS NFR BLD AUTO: 60.8 %
NONHDLC SERPL-MCNC: 141 MG/DL (CALC)
PLATELET # BLD AUTO: 232 THOUSAND/UL (ref 140–400)
PMV BLD REES-ECKER: 10.6 FL (ref 7.5–12.5)
POTASSIUM SERPL-SCNC: 4.4 MMOL/L (ref 3.5–5.3)
PROT SERPL-MCNC: 6.6 G/DL (ref 6.1–8.1)
RBC # BLD AUTO: 5.24 MILLION/UL (ref 3.8–5.1)
SODIUM SERPL-SCNC: 140 MMOL/L (ref 135–146)
TRIGL SERPL-MCNC: 139 MG/DL
TSH SERPL-ACNC: 3.49 MIU/L (ref 0.4–4.5)
WBC # BLD AUTO: 5.3 THOUSAND/UL (ref 3.8–10.8)

## 2025-03-27 NOTE — RESULT ENCOUNTER NOTE
Please call patient with results. I tried calling her and left a message.  Her heart is strong. She has some stiffening of the aortic valve that we will keep an eye on.  Mild thickening of the heart, likely due to high bp.  Continue current meds.

## 2025-03-28 ENCOUNTER — TELEPHONE (OUTPATIENT)
Dept: CARDIOLOGY | Facility: CLINIC | Age: 85
End: 2025-03-28
Payer: MEDICARE

## 2025-03-28 NOTE — TELEPHONE ENCOUNTER
----- Message from Chantell Silva sent at 3/27/2025 12:52 PM EDT -----  Please call patient with results. I tried calling her and left a message.  Her heart is strong. She has some stiffening of the aortic valve that we will keep an eye on.  Mild thickening of the heart, likely due to high bp.  Continue current meds.    Attempted to call pt with message from Chantell Silva. Pt did not answer. Left message for pt to call back

## 2025-03-31 ENCOUNTER — TELEPHONE (OUTPATIENT)
Dept: CARDIOLOGY | Facility: CLINIC | Age: 85
End: 2025-03-31
Payer: MEDICARE

## 2025-04-04 ENCOUNTER — PRE-ADMISSION TESTING (OUTPATIENT)
Dept: PREADMISSION TESTING | Facility: HOSPITAL | Age: 85
End: 2025-04-04
Payer: MEDICARE

## 2025-04-04 VITALS
HEART RATE: 84 BPM | HEIGHT: 61 IN | SYSTOLIC BLOOD PRESSURE: 126 MMHG | TEMPERATURE: 97 F | WEIGHT: 162 LBS | DIASTOLIC BLOOD PRESSURE: 78 MMHG | RESPIRATION RATE: 16 BRPM | OXYGEN SATURATION: 98 % | BODY MASS INDEX: 30.58 KG/M2

## 2025-04-04 PROCEDURE — 99214 OFFICE O/P EST MOD 30 MIN: CPT | Performed by: NURSE PRACTITIONER

## 2025-04-04 RX ORDER — LAMOTRIGINE 25 MG/1
25 TABLET ORAL
COMMUNITY

## 2025-04-04 RX ORDER — LAMOTRIGINE 200 MG/1
1 TABLET ORAL NIGHTLY
COMMUNITY
Start: 2025-03-17

## 2025-04-04 ASSESSMENT — ENCOUNTER SYMPTOMS
RESPIRATORY NEGATIVE: 1
ENDOCRINE NEGATIVE: 1
HEMATOLOGIC/LYMPHATIC NEGATIVE: 1
CONSTITUTIONAL NEGATIVE: 1
EYES NEGATIVE: 1
PSYCHIATRIC NEGATIVE: 1
CARDIOVASCULAR NEGATIVE: 1
NEUROLOGICAL NEGATIVE: 1
MUSCULOSKELETAL NEGATIVE: 1
GASTROINTESTINAL NEGATIVE: 1

## 2025-04-04 ASSESSMENT — DUKE ACTIVITY SCORE INDEX (DASI)
CAN YOU DO MODERATE WORK AROUND THE HOUSE LIKE VACUUMING, SWEEPING FLOORS OR CARRYING GROCERIES: YES
CAN YOU DO HEAVY WORK AROUND THE HOUSE LIKE SCRUBBING FLOORS OR LIFTING AND MOVING HEAVY FURNITURE: YES
CAN YOU CLIMB A FLIGHT OF STAIRS OR WALK UP A HILL: NO
CAN YOU PARTICIPATE IN MODERATE RECREATIONAL ACTIVITIES LIKE GOLF, BOWLING, DANCING, DOUBLES TENNIS OR THROWING A BASEBALL OR FOOTBALL: NO
CAN YOU PARTICIPATE IN STRENOUS SPORTS LIKE SWIMMING, SINGLES TENNIS, FOOTBALL, BASKETBALL, OR SKIING: NO
CAN YOU WALK INDOORS, SUCH AS AROUND YOUR HOUSE: YES
CAN YOU RUN A SHORT DISTANCE: NO
CAN YOU DO YARD WORK LIKE RAKING LEAVES, WEEDING OR PUSHING A MOWER: NO
CAN YOU WALK A BLOCK OR TWO ON LEVEL GROUND: YES
TOTAL_SCORE: 21.45
DASI METS SCORE: 5.4
CAN YOU TAKE CARE OF YOURSELF (EAT, DRESS, BATHE, OR USE TOILET): YES
CAN YOU DO LIGHT WORK AROUND THE HOUSE LIKE DUSTING OR WASHING DISHES: YES
CAN YOU HAVE SEXUAL RELATIONS: NO

## 2025-04-04 NOTE — H&P (VIEW-ONLY)
CPM/PAT Evaluation       Name: Nicole Samson (Nicole Samson)  /Age: 1940/ y.o.     In-Person       Chief Complaint: right breast cancer    HPI    Pt is an 85 year old female with right breast cancer. Pt reports she completed a routine mammogram that revealed an abnormal right breast finding. Pt completed a right breast biopsy that showed: right breast invasive carcinoma in situ. Pt denies breast pain, palpating a right breast mass, nipple discharge, or skin changes to her right breast. Pt discussed the results with her surgeon and has been scheduled for right breast lumpectomy with Magseed localization. Pt denies CP, SOB, or dizziness.     Past Medical History:   Diagnosis Date    Bipolar disorder, unspecified (Multi) 2017    Psychosis, bipolar affective    Dysphagia     Pt was told her    Elevated coronary artery calcium score     Gastroparesis     GERD (gastroesophageal reflux disease)     History of falling 2017    History of fall    Personal history of colonic polyps 2019    History of colonic polyps    Personal history of other diseases of the digestive system 2016    History of gallstones    Personal history of other diseases of the digestive system 2016    History of hiatal hernia    Personal history of other malignant neoplasm of skin 2016    History of malignant neoplasm of skin    Pulmonary nodules     Pulmonary nodules     Vertigo        Past Surgical History:   Procedure Laterality Date    APPENDECTOMY  2016    Appendectomy    BREAST BIOPSY Right 2025    CATARACT EXTRACTION Bilateral     COLONOSCOPY      CT ABDOMEN ANGIOGRAM W AND/OR WO IV CONTRAST  2013    CT ABDOMEN ANGIOGRAM W AND/OR WO IV CONTRAST [facility]    GASTRIC FUNDOPLICATION  2013    Esophagogastric Fundoplasty Nissen Fundoplication    HIP ARTHROPLASTY Bilateral     OTHER SURGICAL HISTORY  2019    Esophagogastroduodenoscopy    OTHER SURGICAL HISTORY   02/09/2016    Biopsy Skin    OTHER SURGICAL HISTORY      EGD with food impaction removal    TOTAL HIP ARTHROPLASTY  08/22/2019    Total Hip Replacement    UPPER GASTROINTESTINAL ENDOSCOPY       Social History     Tobacco Use    Smoking status: Never     Passive exposure: Never    Smokeless tobacco: Never   Substance Use Topics    Alcohol use: Not Currently     Social History     Substance and Sexual Activity   Drug Use Never     Patient  has no history on file for sexual activity.    Family History   Problem Relation Name Age of Onset    Heart disease Mother      Dementia Brother       No Known Allergies    Current Outpatient Medications   Medication Sig Dispense Refill    lamoTRIgine (LaMICtal) 200 mg tablet Take 1 tablet (200 mg) by mouth once daily at bedtime.      ARIPiprazole (Abilify) 2 mg tablet Take 1 tablet (2 mg) by mouth once daily.      aspirin 81 mg EC tablet Take 1 tablet (81 mg) by mouth once daily.      buPROPion XL (Wellbutrin XL) 300 mg 24 hr tablet Take 1 tablet (300 mg) by mouth once daily in the morning. Take before meals.      lamoTRIgine (LaMICtal) 25 mg tablet Take 1 tablet (25 mg) by mouth.      metoprolol succinate XL (Toprol-XL) 50 mg 24 hr tablet Take 1 tablet (50 mg) by mouth once daily. Do not crush or chew. (Patient not taking: Reported on 4/4/2025) 90 tablet 3    multivitamin tablet Take 1 tablet by mouth once daily.      QUEtiapine (SEROquel) 100 mg tablet Take 1 tablet (100 mg) by mouth once daily at bedtime.      rosuvastatin (Crestor) 20 mg tablet Take 1 tablet (20 mg) by mouth once daily. (Patient not taking: Reported on 4/4/2025) 90 tablet 3     No current facility-administered medications for this visit.       Review of Systems   Constitutional: Negative.    HENT: Negative.     Eyes: Negative.    Respiratory: Negative.     Cardiovascular: Negative.    Gastrointestinal: Negative.         H/O episodes of dysphagia since Hiatal hernia surgery in 2013.    Endocrine: Negative.     Genitourinary: Negative.    Musculoskeletal: Negative.    Skin: Negative.    Neurological: Negative.         Balance issues pt ambulates with a cane or rollator.    Hematological: Negative.    Psychiatric/Behavioral: Negative.       Physical Exam  Vitals reviewed.   Constitutional:       Appearance: Normal appearance.   HENT:      Head: Normocephalic and atraumatic.      Nose: Nose normal.      Mouth/Throat:      Mouth: Mucous membranes are moist.      Pharynx: Oropharynx is clear.   Eyes:      Extraocular Movements: Extraocular movements intact.      Conjunctiva/sclera: Conjunctivae normal.      Pupils: Pupils are equal, round, and reactive to light.   Cardiovascular:      Rate and Rhythm: Normal rate and regular rhythm.      Pulses: Normal pulses.      Heart sounds: Normal heart sounds.   Pulmonary:      Effort: Pulmonary effort is normal. No respiratory distress.      Breath sounds: Normal breath sounds. No wheezing, rhonchi or rales.   Abdominal:      General: Bowel sounds are normal.      Palpations: Abdomen is soft.      Tenderness: There is no abdominal tenderness. There is no guarding or rebound.   Musculoskeletal:         General: Normal range of motion.      Cervical back: Normal range of motion and neck supple.      Right lower leg: Edema (trace pitting edema; no erythema or pain) present.      Comments: Ambulating with a cane   Skin:     General: Skin is warm and dry.   Neurological:      General: No focal deficit present.      Mental Status: She is alert and oriented to person, place, and time. Mental status is at baseline.   Psychiatric:         Mood and Affect: Mood normal.         Behavior: Behavior normal.         Thought Content: Thought content normal.         Judgment: Judgment normal.          PAT AIRWAY:   Airway:     Mallampati::  II    TM distance::  >3 FB    Neck ROM::  Full   upper dentures    Visit Vitals  /78   Pulse 84   Temp 36.1 °C (97 °F) (Temporal)   Resp 16   Ht 1.549 m (5'  "1\")   Wt 73.5 kg (162 lb)   SpO2 98%   BMI 30.61 kg/m²   OB Status Postmenopausal   Smoking Status Never   BSA 1.78 m²     ASA: 3  CHADS: 2.8%  RCRI: 0.4%  Ariscat: 1.6%  DASI Risk Score      Flowsheet Row [facility]Admission [facility]   Can you take care of yourself (eat, dress, bathe, or use toilet)?  2.75 filed at 04/04/2025 0832   Can you walk indoors, such as around your house? 1.75 filed at 04/04/2025 0832   Can you walk a block or two on level ground?  2.75  [with cane] filed at 04/04/2025 0832   Can you climb a flight of stairs or walk up a hill? 0 filed at 04/04/2025 0832   Can you run a short distance? 0 filed at 04/04/2025 0832   Can you do light work around the house like dusting or washing dishes? 2.7 filed at 04/04/2025 0832   Can you do moderate work around the house like vacuuming, sweeping floors or carrying groceries? 3.5 filed at 04/04/2025 0832   Can you do heavy work around the house like scrubbing floors or lifting and moving heavy furniture?  8 filed at 04/04/2025 0832   Can you do yard work like raking leaves, weeding or pushing a mower? 0 filed at 04/04/2025 0832   Can you have sexual relations? 0 filed at 04/04/2025 0832   Can you participate in moderate recreational activities like golf, bowling, dancing, doubles tennis or throwing a baseball or football? 0 filed at 04/04/2025 0832   Can you participate in strenous sports like swimming, singles tennis, football, basketball, or skiing? 0 filed at 04/04/2025 0832   DASI SCORE 21.45 filed at 04/04/2025 0832   METS Score (Will be calculated only when all the questions are answered) 5.4 filed at 04/04/2025 0832          Caprini DVT Assessment    No data to display       Modified Frailty Index    No data to display       [id number] Stroke Risk Points  Current as of just now        N/A 0 to 9 Points:      Last Change: N/A          The QRI3FU9-QJNz risk score (Lip GH, et al. 2009. © 2010 American College of " Chest Physicians) quantifies the risk of stroke for a patient with atrial fibrillation. For patients without atrial fibrillation or under the age of 18 this score appears as N/A. Higher score values generally indicate higher risk of stroke.        This score is not applicable to this patient. Components are not calculated.          Revised Cardiac Risk Index      Flowsheet Row Pre-Admission Testing from 4/4/2025 in [facility]   High-Risk Surgery (Intraperitoneal, Intrathoracic,Suprainguinal vascular) 0 filed at 04/04/2025 1026   History of ischemic heart disease (History of MI, History of positive exercuse test, Current chest paint considered due to myocardial ischemia, Use of nitrate therapy, ECG with pathological Q Waves) 0 filed at 04/04/2025 1026   History of congestive heart failure (pulmonary edemia, bilateral rales or S3 gallop, Paroxysmal nocturnal dyspnea, CXR showing pulmonary vascular redistribution) 0 filed at 04/04/2025 1026   History of cerebrovascular disease (Prior TIA or stroke) 0 filed at 04/04/2025 1026   Pre-operative insulin treatment 0 filed at 04/04/2025 1026   Pre-operative creatinine>2 mg/dl 0 filed at 04/04/2025 1026   Revised Cardiac Risk Calculator 0 filed at 04/04/2025 1026          Apfel Simplified Score    No data to display       Risk Analysis Index Results This Encounter    No data found in the last 10 encounters.       Stop Bang Score      Flowsheet Row Pre-Admission Testing from 4/4/2025 in [facility]   Do you snore loudly? 1 filed at 04/04/2025 0833   Do you often feel tired or fatigued after your sleep? 0 filed at 04/04/2025 0833   Has anyone ever observed you stop breathing in your sleep? 0 filed at 04/04/2025 0833   Do you have or are you being treated for high blood pressure? 0 filed at 04/04/2025 0833   Recent BMI (Calculated) 30.6 filed at 04/04/2025 0833   Is BMI greater than 35 kg/m2? 0=No filed at 04/04/2025 0833   Age older than 50 years  old? 1=Yes filed at 04/04/2025 0833   Is your neck circumference greater than 17 inches (Male) or 16 inches (Female)? 1 filed at 04/04/2025 0833   Gender - Male 0=No filed at 04/04/2025 0833   STOP-BANG Total Score 3 filed at 04/04/2025 0833          Prodigy: High Risk  Total Score: 16              Prodigy Age Score           ARISCAT Score for Postoperative Pulmonary Complications    No data to display       Owen Perioperative Risk for Myocardial Infarction or Cardiac Arrest (JANET)    No data to display         Assessment and Plan:     Invasive ductal carcinoma of breast, female, right: Lumpectomy with Magseed Localization right.   Elevated calcium score: CT cardiac  scoring 2022: Coronary artery calcium score of 718.06 *. Pt is taking aspirin. Pt was prescribed rosuvastatin but she does not want to take this medication. Pt is followed by Dr Coley.   H/O: Gastroparesis  H/O vertigo  Bipolar: Pt is taking quetiapine, lamotrigine, and bupropion XL, and Abilify.  Chest pain: Pt had an episode of chest pain that radiated into her neck earlier this year. Pt has not experienced any further chest pain episodes. She went to her cardiologist who had her complete an ECHO and stress test results below. Will send over form for Dr Coley to fill out cardiac clearance.   GERD: Pt has Dejon fundoplication surgery in 2013.  Dysphagia: Pt reports a few episodes requiring EGD with food impaction removal. Pt stated these symptoms started after Hiatal hernia surgery in 2013. She has had an EGD with dilation in the past but continues to have an occasional symptom of foot becoming stuck in her throat.   Poor balance: ambulates with a walker or rollator. Pt stated she has fallen in the past.   BMI: 30.6    ECHO 3/24/2025:  CONCLUSIONS:   1. The left ventricular systolic function is normal, with a visually estimated ejection fraction of 60-65%.   2. Spectral Doppler shows an abnormal pattern of left ventricular diastolic filling.   3.  The left atrium is mildly dilated.   4. Aortic valve sclerosis.   5. There is mild concentric left ventricular hypertrophy.    Nuclear stress test 2/28/2025:  Impression   1. No evidence of stress-induced ischemia or prior infarction.  2. The left ventricle is normal in size.  3. Normal LV wall motion with an LV EF estimated at greater than 65%.     Requesting Dr Weinstein to write cardiac clearance. Form was faxed for Dr Weinstein to Fill out.     TRINI Hooks-CNP    ADDENDUM:  CARDIAC CLEARANCE DONE BY DR. JERRY WEINSTEIN - PATIENT OK TO PROCEED WITH SURGERY WITH LOW CARDIOVASCULAR RISK - CLEARANCE SCANNED UNDER MEDIA TAB            English

## 2025-04-04 NOTE — CPM/PAT H&P
CPM/PAT Evaluation       Name: Nicole Samson (Nicole Samson)  /Age: 1940/ y.o.     In-Person       Chief Complaint: right breast cancer    HPI    Pt is an 85 year old female with right breast cancer. Pt reports she completed a routine mammogram that revealed an abnormal right breast finding. Pt completed a right breast biopsy that showed: right breast invasive carcinoma in situ. Pt denies breast pain, palpating a right breast mass, nipple discharge, or skin changes to her right breast. Pt discussed the results with her surgeon and has been scheduled for right breast lumpectomy with Magseed localization. Pt denies CP, SOB, or dizziness.     Past Medical History:   Diagnosis Date    Bipolar disorder, unspecified (Multi) 2017    Psychosis, bipolar affective    Dysphagia     Pt was told her    Elevated coronary artery calcium score     Gastroparesis     GERD (gastroesophageal reflux disease)     History of falling 2017    History of fall    Personal history of colonic polyps 2019    History of colonic polyps    Personal history of other diseases of the digestive system 2016    History of gallstones    Personal history of other diseases of the digestive system 2016    History of hiatal hernia    Personal history of other malignant neoplasm of skin 2016    History of malignant neoplasm of skin    Pulmonary nodules     Pulmonary nodules     Vertigo        Past Surgical History:   Procedure Laterality Date    APPENDECTOMY  2016    Appendectomy    BREAST BIOPSY Right 2025    CATARACT EXTRACTION Bilateral     COLONOSCOPY      CT ABDOMEN ANGIOGRAM W AND/OR WO IV CONTRAST  2013    CT ABDOMEN ANGIOGRAM W AND/OR WO IV CONTRAST [facility]    GASTRIC FUNDOPLICATION  2013    Esophagogastric Fundoplasty Nissen Fundoplication    HIP ARTHROPLASTY Bilateral     OTHER SURGICAL HISTORY  2019    Esophagogastroduodenoscopy    OTHER SURGICAL HISTORY   02/09/2016    Biopsy Skin    OTHER SURGICAL HISTORY      EGD with food impaction removal    TOTAL HIP ARTHROPLASTY  08/22/2019    Total Hip Replacement    UPPER GASTROINTESTINAL ENDOSCOPY       Social History     Tobacco Use    Smoking status: Never     Passive exposure: Never    Smokeless tobacco: Never   Substance Use Topics    Alcohol use: Not Currently     Social History     Substance and Sexual Activity   Drug Use Never     Patient  has no history on file for sexual activity.    Family History   Problem Relation Name Age of Onset    Heart disease Mother      Dementia Brother       No Known Allergies    Current Outpatient Medications   Medication Sig Dispense Refill    lamoTRIgine (LaMICtal) 200 mg tablet Take 1 tablet (200 mg) by mouth once daily at bedtime.      ARIPiprazole (Abilify) 2 mg tablet Take 1 tablet (2 mg) by mouth once daily.      aspirin 81 mg EC tablet Take 1 tablet (81 mg) by mouth once daily.      buPROPion XL (Wellbutrin XL) 300 mg 24 hr tablet Take 1 tablet (300 mg) by mouth once daily in the morning. Take before meals.      lamoTRIgine (LaMICtal) 25 mg tablet Take 1 tablet (25 mg) by mouth.      metoprolol succinate XL (Toprol-XL) 50 mg 24 hr tablet Take 1 tablet (50 mg) by mouth once daily. Do not crush or chew. (Patient not taking: Reported on 4/4/2025) 90 tablet 3    multivitamin tablet Take 1 tablet by mouth once daily.      QUEtiapine (SEROquel) 100 mg tablet Take 1 tablet (100 mg) by mouth once daily at bedtime.      rosuvastatin (Crestor) 20 mg tablet Take 1 tablet (20 mg) by mouth once daily. (Patient not taking: Reported on 4/4/2025) 90 tablet 3     No current facility-administered medications for this visit.       Review of Systems   Constitutional: Negative.    HENT: Negative.     Eyes: Negative.    Respiratory: Negative.     Cardiovascular: Negative.    Gastrointestinal: Negative.         H/O episodes of dysphagia since Hiatal hernia surgery in 2013.    Endocrine: Negative.     Genitourinary: Negative.    Musculoskeletal: Negative.    Skin: Negative.    Neurological: Negative.         Balance issues pt ambulates with a cane or rollator.    Hematological: Negative.    Psychiatric/Behavioral: Negative.       Physical Exam  Vitals reviewed.   Constitutional:       Appearance: Normal appearance.   HENT:      Head: Normocephalic and atraumatic.      Nose: Nose normal.      Mouth/Throat:      Mouth: Mucous membranes are moist.      Pharynx: Oropharynx is clear.   Eyes:      Extraocular Movements: Extraocular movements intact.      Conjunctiva/sclera: Conjunctivae normal.      Pupils: Pupils are equal, round, and reactive to light.   Cardiovascular:      Rate and Rhythm: Normal rate and regular rhythm.      Pulses: Normal pulses.      Heart sounds: Normal heart sounds.   Pulmonary:      Effort: Pulmonary effort is normal. No respiratory distress.      Breath sounds: Normal breath sounds. No wheezing, rhonchi or rales.   Abdominal:      General: Bowel sounds are normal.      Palpations: Abdomen is soft.      Tenderness: There is no abdominal tenderness. There is no guarding or rebound.   Musculoskeletal:         General: Normal range of motion.      Cervical back: Normal range of motion and neck supple.      Right lower leg: Edema (trace pitting edema; no erythema or pain) present.      Comments: Ambulating with a cane   Skin:     General: Skin is warm and dry.   Neurological:      General: No focal deficit present.      Mental Status: She is alert and oriented to person, place, and time. Mental status is at baseline.   Psychiatric:         Mood and Affect: Mood normal.         Behavior: Behavior normal.         Thought Content: Thought content normal.         Judgment: Judgment normal.          PAT AIRWAY:   Airway:     Mallampati::  II    TM distance::  >3 FB    Neck ROM::  Full   upper dentures    Visit Vitals  /78   Pulse 84   Temp 36.1 °C (97 °F) (Temporal)   Resp 16   Ht 1.549 m (5'  "1\")   Wt 73.5 kg (162 lb)   SpO2 98%   BMI 30.61 kg/m²   OB Status Postmenopausal   Smoking Status Never   BSA 1.78 m²     ASA: 3  CHADS: 2.8%  RCRI: 0.4%  Ariscat: 1.6%  DASI Risk Score      Flowsheet Row Pre-Admission Testing from 4/4/2025 in [facility]   Can you take care of yourself (eat, dress, bathe, or use toilet)?  2.75 filed at 04/04/2025 0832   Can you walk indoors, such as around your house? 1.75 filed at 04/04/2025 0832   Can you walk a block or two on level ground?  2.75  [with cane] filed at 04/04/2025 0832   Can you climb a flight of stairs or walk up a hill? 0 filed at 04/04/2025 0832   Can you run a short distance? 0 filed at 04/04/2025 0832   Can you do light work around the house like dusting or washing dishes? 2.7 filed at 04/04/2025 0832   Can you do moderate work around the house like vacuuming, sweeping floors or carrying groceries? 3.5 filed at 04/04/2025 0832   Can you do heavy work around the house like scrubbing floors or lifting and moving heavy furniture?  8 filed at 04/04/2025 0832   Can you do yard work like raking leaves, weeding or pushing a mower? 0 filed at 04/04/2025 0832   Can you have sexual relations? 0 filed at 04/04/2025 0832   Can you participate in moderate recreational activities like golf, bowling, dancing, doubles tennis or throwing a baseball or football? 0 filed at 04/04/2025 0832   Can you participate in strenous sports like swimming, singles tennis, football, basketball, or skiing? 0 filed at 04/04/2025 0832   DASI SCORE 21.45 filed at 04/04/2025 0832   METS Score (Will be calculated only when all the questions are answered) 5.4 filed at 04/04/2025 0832          Caprini DVT Assessment    No data to display       Modified Frailty Index    No data to display       [id number] Stroke Risk Points  Current as of just now        N/A 0 to 9 Points:      Last Change: N/A          The LAJ4ZA3-ITWj risk score (Lip GH, et al. 2009. © 2010 American College of " Chest Physicians) quantifies the risk of stroke for a patient with atrial fibrillation. For patients without atrial fibrillation or under the age of 18 this score appears as N/A. Higher score values generally indicate higher risk of stroke.        This score is not applicable to this patient. Components are not calculated.          Revised Cardiac Risk Index      Flowsheet Row Pre-Admission Testing from 4/4/2025 in [facility]   High-Risk Surgery (Intraperitoneal, Intrathoracic,Suprainguinal vascular) 0 filed at 04/04/2025 1026   History of ischemic heart disease (History of MI, History of positive exercuse test, Current chest paint considered due to myocardial ischemia, Use of nitrate therapy, ECG with pathological Q Waves) 0 filed at 04/04/2025 1026   History of congestive heart failure (pulmonary edemia, bilateral rales or S3 gallop, Paroxysmal nocturnal dyspnea, CXR showing pulmonary vascular redistribution) 0 filed at 04/04/2025 1026   History of cerebrovascular disease (Prior TIA or stroke) 0 filed at 04/04/2025 1026   Pre-operative insulin treatment 0 filed at 04/04/2025 1026   Pre-operative creatinine>2 mg/dl 0 filed at 04/04/2025 1026   Revised Cardiac Risk Calculator 0 filed at 04/04/2025 1026          Apfel Simplified Score    No data to display       Risk Analysis Index Results This Encounter    No data found in the last 10 encounters.       Stop Bang Score      Flowsheet Row Pre-Admission Testing from 4/4/2025 in [facility]   Do you snore loudly? 1 filed at 04/04/2025 0833   Do you often feel tired or fatigued after your sleep? 0 filed at 04/04/2025 0833   Has anyone ever observed you stop breathing in your sleep? 0 filed at 04/04/2025 0833   Do you have or are you being treated for high blood pressure? 0 filed at 04/04/2025 0833   Recent BMI (Calculated) 30.6 filed at 04/04/2025 0833   Is BMI greater than 35 kg/m2? 0=No filed at 04/04/2025 0833   Age older than 50 years  old? 1=Yes filed at 04/04/2025 0833   Is your neck circumference greater than 17 inches (Male) or 16 inches (Female)? 1 filed at 04/04/2025 0833   Gender - Male 0=No filed at 04/04/2025 0833   STOP-BANG Total Score 3 filed at 04/04/2025 0833          Prodigy: High Risk  Total Score: 16              Prodigy Age Score           ARISCAT Score for Postoperative Pulmonary Complications    No data to display       Owen Perioperative Risk for Myocardial Infarction or Cardiac Arrest (JANET)    No data to display         Assessment and Plan:     Invasive ductal carcinoma of breast, female, right: Lumpectomy with Magseed Localization right.   Elevated calcium score: CT cardiac  scoring 2022: Coronary artery calcium score of 718.06 *. Pt is taking aspirin. Pt was prescribed rosuvastatin but she does not want to take this medication. Pt is followed by Dr Coley.   H/O: Gastroparesis  H/O vertigo  Bipolar: Pt is taking quetiapine, lamotrigine, and bupropion XL, and Abilify.  Chest pain: Pt had an episode of chest pain that radiated into her neck earlier this year. Pt has not experienced any further chest pain episodes. She went to her cardiologist who had her complete an ECHO and stress test results below. Will send over form for Dr Coley to fill out cardiac clearance.   GERD: Pt has Dejon fundoplication surgery in 2013.  Dysphagia: Pt reports a few episodes requiring EGD with food impaction removal. Pt stated these symptoms started after Hiatal hernia surgery in 2013. She has had an EGD with dilation in the past but continues to have an occasional symptom of foot becoming stuck in her throat.   Poor balance: ambulates with a walker or rollator. Pt stated she has fallen in the past.   BMI: 30.6    ECHO 3/24/2025:  CONCLUSIONS:   1. The left ventricular systolic function is normal, with a visually estimated ejection fraction of 60-65%.   2. Spectral Doppler shows an abnormal pattern of left ventricular diastolic filling.   3.  The left atrium is mildly dilated.   4. Aortic valve sclerosis.   5. There is mild concentric left ventricular hypertrophy.    Nuclear stress test 2/28/2025:  Impression   1. No evidence of stress-induced ischemia or prior infarction.  2. The left ventricle is normal in size.  3. Normal LV wall motion with an LV EF estimated at greater than 65%.     Requesting Dr Coley to write cardiac clearance. Form was faxed for Dr Coley to Fill out.     TRINI Hooks-CNP

## 2025-04-04 NOTE — PREPROCEDURE INSTRUCTIONS
Why must I stop eating and drinking near surgery time?  With sedation, food or liquid in your stomach can enter your lungs causing serious complications  Increases nausea and vomiting    When do I need to stop eating and drinking before my surgery?   Do not eat or drink after midnight the night before your surgery/procedure.  You may have small sips of water to take your medication.    PAT DISCHARGE INSTRUCTIONS    Please call the Same Day Surgery (SDS) Department of the hospital where your procedure will be performed after 2:00 PM the day before your surgery. If you are scheduled on a Monday, or a Tuesday following a Monday holiday, you will need to call on the last business day prior to your surgery.    [place] [place] [id number] [place] [id number] [phone]  Second Floor      Please let your surgeon know if:      You develop any open sores, shingles, burning or painful urination as these may increase your risk of an infection.   You no longer wish to have the surgery.   Any other personal circumstances change that may lead to the need to cancel or defer this surgery-such as being sick or getting admitted to any hospital within one week of your planned procedure.    Your contact details change, such as a change of address or phone number.    Starting now:     Please DO NOT drink alcohol or smoke for 24 hours before surgery. It is well known that quitting smoking can make a huge difference to your health and recovery from surgery. The longer you abstain from smoking, the better your chances of a healthy recovery. If you need help with quitting, call 5-800-QUIT-NOW to be connected to a trained counselor who will discuss the best methods to help you quit.     Before your surgery:    Please stop all supplements 7 days prior to surgery. Or as directed by your surgeon.   Please stop taking NSAID pain medicine such as Advil and Motrin 7 days before surgery.    If you develop  any fever, cough, cold, rashes, cuts, scratches, scrapes, urinary symptoms or infection anywhere on your body (including teeth and gums) prior to surgery, please call your surgeon’s office as soon as possible. This may require treatment to reduce the chance of cancellation on the day of surgery.    The day before your surgery:   DIET- Please follow the diet instructions at the top of your packet.   Get a good night’s rest.  Use the special soap for bathing if you have been instructed to use one.    Scheduled surgery times may change and you will be notified if this occurs - please check your personal voicemail for any updates.     On the morning of surgery:   Wear comfortable, loose fitting clothes which open in the front. Please do not wear moisturizers, creams, lotions, makeup or perfume.    Please bring with you to surgery:   Photo ID and insurance card   Current list of medicines and allergies   Pacemaker/ Defibrillator/Heart stent cards   CPAP machine and mask    Slings/ splints/ crutches   A copy of your complete advanced directive/DHPOA.    Please do NOT bring with you to surgery:   All jewelry and valuables should be left at home.   Prosthetic devices such as contact lenses, hearing aids, dentures, eyelash extensions, hairpins and body piercings must be removed prior to going in to the surgical suite.    After outpatient surgery:   A responsible adult MUST accompany you at the time of discharge and stay with you for 24 hours after your surgery. You may NOT drive yourself home after surgery.    Do not drive, operate machinery, make critical decisions or do activities that require co-ordination or balance until after a night’s sleep.   Do not drink alcoholic beverages for 24 hours.   Instructions for resuming your medications will be provided by your surgeon.    CALL YOUR DOCTOR AFTER SURGERY IF YOU HAVE:     Chills and/or a fever of 101° F or higher.    Redness, swelling, pus or drainage from your surgical  wound or a bad smell from the wound.    Lightheadedness, fainting or confusion.    Persistent vomiting (throwing up) and are not able to eat or drink for 12 hours.    Three or more loose, watery bowel movements in 24 hours (diarrhea).   Difficulty or pain while urinating( after non-urological surgery)    Pain and swelling in your legs, especially if it is only on one side.    Difficulty breathing or are breathing faster than normal.    Any new concerning symptoms.           Medication List            Accurate as of April 4, 2025  8:39 AM. Always use your most recent med list.                ARIPiprazole 2 mg tablet  Commonly known as: Abilify  Medication Adjustments for Surgery: Take on the morning of surgery     aspirin 81 mg EC tablet  Additional Medication Adjustments for Surgery: Take last dose 7 days before surgery     buPROPion  mg 24 hr tablet  Commonly known as: Wellbutrin XL  Medication Adjustments for Surgery: Take on the morning of surgery     * lamoTRIgine 25 mg tablet  Commonly known as: LaMICtal  Medication Adjustments for Surgery: Take on the morning of surgery     * lamoTRIgine 200 mg tablet  Commonly known as: LaMICtal  Notes to patient: Take evening dose before surgery.     metoprolol succinate XL 50 mg 24 hr tablet  Commonly known as: Toprol-XL  Take 1 tablet (50 mg) by mouth once daily. Do not crush or chew.  Notes to patient: NOT TAKING     multivitamin tablet  Additional Medication Adjustments for Surgery: Take last dose 7 days before surgery     QUEtiapine 100 mg tablet  Commonly known as: SEROquel  Notes to patient: Take evening dose before surgery.     rosuvastatin 20 mg tablet  Commonly known as: Crestor  Take 1 tablet (20 mg) by mouth once daily.  Notes to patient: NOT TAKING           * This list has 2 medication(s) that are the same as other medications prescribed for you. Read the directions carefully, and ask your doctor or other care provider to review them with you.                            Preoperative Brain Exercises    What are brain exercises?  A brain exercise is any activity that engages your thinking (cognitive) skills.    What types of activities are considered brain exercises?  Jigsaw puzzles, crossword puzzles, word jumble, memory games, word search, and many more.  Many can be found free online or on your phone via a mobile won.    Why should I do brain exercises before my surgery?  More recent research has shown brain exercise before surgery can lower the risk of postoperative delirium (confusion) which can be especially important for older adults.  Patients who did brain exercises for 5 to 10 hours the days before surgery, cut their risk of postoperative delirium in half up to 1 week after surgery.          [facility]    Preoperative Deep Breathing Exercises    Why it is important to do deep breathing exercises before my surgery?  Deep breathing exercises strengthen your breathing muscles.  This helps you to recover after your surgery and decreases the chance of breathing complications.      How are the deep breathing exercises done?  Sit straight with your back supported.  Breathe in deeply and slowly through your nose. Your lower rib cage should expand and your abdomen may move forward.  Hold that breath for 3 to 5 seconds.  Breathe out through pursed lips, slowly and completely.  Rest and repeat 10 times every hour while awake.  Rest longer if you become dizzy or lightheaded.      Patient Information: Incentive Spirometer  What is an incentive spirometer?  An incentive spirometer is a device used before and after surgery to “exercise” your lungs.  It helps you to take deeper breaths to expand your lungs.  Below is an example of a basic incentive spirometer.  The device you receive may differ slightly but they all function the same.    Why do I need to use an incentive spirometer?  Using your incentive spirometer prepares your lungs for surgery  and helps prevent lung problems after surgery.  How do I use my incentive spirometer?  When you're using your incentive spirometer, make sure to breathe through your mouth. If you breathe through your nose, the incentive spirometer won't work properly. You can hold your nose if you have trouble.  If you feel dizzy at any time, stop and rest. Try again at a later time.  Follow the steps below:  Set up your incentive spirometer, expand the flexible tubing and connect to the outlet.  Sit upright in a chair or bed. Hold the incentive spirometer at eye level.   Put the mouthpiece in your mouth and close your lips tightly around it. Slowly breathe out (exhale) completely.  Breathe in (inhale) slowly through your mouth as deeply as you can. As you take a breath, you will see the piston rise inside the large column. While the piston rises, the indicator should move upwards. It should stay in between the 2 arrows (see Figure).  Try to get the piston as high as you can, while keeping the indicator between the arrows.   If the indicator doesn't stay between the arrows, you're breathing either too fast or too slow.  When you get it as high as you can, hold your breath for 10 seconds, or as long as possible. While you're holding your breath, the piston will slowly fall to the base of the spirometer.  Once the piston reaches the bottom of the spirometer, breathe out slowly through your mouth. Rest for a few seconds.  Repeat 10 times. Try to get the piston to the same level with each breath.  Repeat every hour while awake  You can carefully clean the outside of the mouthpiece with an alcohol wipe or soap and water.      Patient and Family Education             Ways You Can Help Prevent Blood Clots             This handout explains some simple things you can do to help prevent blood clots.      Blood clots are blockages that can form in the body's veins. When a blood clot forms in your deep veins, it may be called a deep vein  thrombosis, or DVT for short. Blood clots can happen in any part of the body where blood flows, but they are most common in the arms and legs. If a piece of a blood clot breaks free and travels to the lungs, it is called a pulmonary embolus (PE). A PE can be a very serious problem.         Being in the hospital or having surgery can raise your chances of getting a blood clot because you may not be well enough to move around as much as you normally do.         Ways you can help prevent blood clots in the hospital         Wearing SCDs. SCDs stands for Sequential Compression Devices.   SCDs are special sleeves that wrap around your legs  They attach to a pump that fills them with air to gently squeeze your legs every few minutes.   This helps return the blood in your legs to your heart.   SCDs should only be taken off when walking or bathing.   SCDs may not be comfortable, but they can help save your life.               Wearing compression stockings - if your doctor orders them. These special snug fitting stockings gently squeeze your legs to help blood flow.       Walking. Walking helps move the blood in your legs.   If your doctor says it is ok, try walking the halls at least   5 times a day. Ask us to help you get up, so you don't fall.      Taking any blood thinning medicines your doctor orders.        Page 1 of 2     [facility]; 3/23   Ways you can help prevent blood clots at home       Wearing compression stockings - if your doctor orders them. ? Walking - to help move the blood in your legs.       Taking any blood thinning medicines your doctor orders.      Signs of a blood clot or PE      Tell your doctor or nurse know right away if you have of the problems listed below.    If you are at home, seek medical care right away. Call 911 for chest pain or problems breathing.               Signs of a blood clot (DVT) - such as pain,  swelling, redness or warmth in your arm or leg      Signs of a pulmonary  embolism (PE) - such as chest     pain or feeling short of breath

## 2025-04-18 ENCOUNTER — ANESTHESIA EVENT (OUTPATIENT)
Dept: OPERATING ROOM | Facility: HOSPITAL | Age: 85
End: 2025-04-18
Payer: MEDICARE

## 2025-04-18 ENCOUNTER — HOSPITAL ENCOUNTER (OUTPATIENT)
Facility: HOSPITAL | Age: 85
Setting detail: OUTPATIENT SURGERY
Discharge: HOME | End: 2025-04-18
Attending: SURGERY | Admitting: SURGERY
Payer: MEDICARE

## 2025-04-18 ENCOUNTER — PHARMACY VISIT (OUTPATIENT)
Dept: PHARMACY | Facility: CLINIC | Age: 85
End: 2025-04-18
Payer: MEDICARE

## 2025-04-18 ENCOUNTER — ANESTHESIA (OUTPATIENT)
Dept: OPERATING ROOM | Facility: HOSPITAL | Age: 85
End: 2025-04-18
Payer: MEDICARE

## 2025-04-18 ENCOUNTER — HOSPITAL ENCOUNTER (OUTPATIENT)
Dept: RADIOLOGY | Facility: HOSPITAL | Age: 85
Setting detail: OUTPATIENT SURGERY
Discharge: HOME | End: 2025-04-18
Payer: MEDICARE

## 2025-04-18 VITALS
DIASTOLIC BLOOD PRESSURE: 77 MMHG | HEART RATE: 64 BPM | TEMPERATURE: 97.2 F | RESPIRATION RATE: 17 BRPM | OXYGEN SATURATION: 95 % | SYSTOLIC BLOOD PRESSURE: 155 MMHG

## 2025-04-18 DIAGNOSIS — C50.919 BREAST CANCER: ICD-10-CM

## 2025-04-18 DIAGNOSIS — C50.911 INVASIVE DUCTAL CARCINOMA OF BREAST, FEMALE, RIGHT: Primary | ICD-10-CM

## 2025-04-18 PROCEDURE — 3600000004 HC OR TIME - INITIAL BASE CHARGE - PROCEDURE LEVEL FOUR: Performed by: SURGERY

## 2025-04-18 PROCEDURE — 2500000004 HC RX 250 GENERAL PHARMACY W/ HCPCS (ALT 636 FOR OP/ED): Performed by: SURGERY

## 2025-04-18 PROCEDURE — 7100000002 HC RECOVERY ROOM TIME - EACH INCREMENTAL 1 MINUTE: Performed by: SURGERY

## 2025-04-18 PROCEDURE — 3600000009 HC OR TIME - EACH INCREMENTAL 1 MINUTE - PROCEDURE LEVEL FOUR: Performed by: SURGERY

## 2025-04-18 PROCEDURE — 2500000001 HC RX 250 WO HCPCS SELF ADMINISTERED DRUGS (ALT 637 FOR MEDICARE OP): Performed by: ANESTHESIOLOGY

## 2025-04-18 PROCEDURE — 19301 PARTIAL MASTECTOMY: CPT | Performed by: SURGERY

## 2025-04-18 PROCEDURE — 3700000002 HC GENERAL ANESTHESIA TIME - EACH INCREMENTAL 1 MINUTE: Performed by: SURGERY

## 2025-04-18 PROCEDURE — RXMED WILLOW AMBULATORY MEDICATION CHARGE

## 2025-04-18 PROCEDURE — A4649 SURGICAL SUPPLIES: HCPCS | Performed by: SURGERY

## 2025-04-18 PROCEDURE — 76098 X-RAY EXAM SURGICAL SPECIMEN: CPT

## 2025-04-18 PROCEDURE — 2720000007 HC OR 272 NO HCPCS: Performed by: SURGERY

## 2025-04-18 PROCEDURE — 7100000009 HC PHASE TWO TIME - INITIAL BASE CHARGE: Performed by: SURGERY

## 2025-04-18 PROCEDURE — 7100000001 HC RECOVERY ROOM TIME - INITIAL BASE CHARGE: Performed by: SURGERY

## 2025-04-18 PROCEDURE — 7100000010 HC PHASE TWO TIME - EACH INCREMENTAL 1 MINUTE: Performed by: SURGERY

## 2025-04-18 PROCEDURE — 3700000001 HC GENERAL ANESTHESIA TIME - INITIAL BASE CHARGE: Performed by: SURGERY

## 2025-04-18 PROCEDURE — 88307 TISSUE EXAM BY PATHOLOGIST: CPT | Mod: TC,TRILAB | Performed by: SURGERY

## 2025-04-18 PROCEDURE — 2500000004 HC RX 250 GENERAL PHARMACY W/ HCPCS (ALT 636 FOR OP/ED): Mod: JZ | Performed by: ANESTHESIOLOGIST ASSISTANT

## 2025-04-18 RX ORDER — BUPIVACAINE HYDROCHLORIDE 5 MG/ML
INJECTION, SOLUTION PERINEURAL AS NEEDED
Status: DISCONTINUED | OUTPATIENT
Start: 2025-04-18 | End: 2025-04-18 | Stop reason: HOSPADM

## 2025-04-18 RX ORDER — HYDROMORPHONE HYDROCHLORIDE 0.2 MG/ML
0.1 INJECTION INTRAMUSCULAR; INTRAVENOUS; SUBCUTANEOUS EVERY 5 MIN PRN
Status: DISCONTINUED | OUTPATIENT
Start: 2025-04-18 | End: 2025-04-18 | Stop reason: HOSPADM

## 2025-04-18 RX ORDER — LIDOCAINE HYDROCHLORIDE AND EPINEPHRINE 10; 10 UG/ML; MG/ML
INJECTION, SOLUTION INFILTRATION; PERINEURAL AS NEEDED
Status: DISCONTINUED | OUTPATIENT
Start: 2025-04-18 | End: 2025-04-18 | Stop reason: HOSPADM

## 2025-04-18 RX ORDER — KETOROLAC TROMETHAMINE 30 MG/ML
15 INJECTION, SOLUTION INTRAMUSCULAR; INTRAVENOUS ONCE AS NEEDED
Status: DISCONTINUED | OUTPATIENT
Start: 2025-04-18 | End: 2025-04-18 | Stop reason: HOSPADM

## 2025-04-18 RX ORDER — HYDRALAZINE HYDROCHLORIDE 20 MG/ML
10 INJECTION INTRAMUSCULAR; INTRAVENOUS EVERY 30 MIN PRN
Status: DISCONTINUED | OUTPATIENT
Start: 2025-04-18 | End: 2025-04-18 | Stop reason: HOSPADM

## 2025-04-18 RX ORDER — NORETHINDRONE AND ETHINYL ESTRADIOL 0.5-0.035
50 KIT ORAL ONCE
Status: DISCONTINUED | OUTPATIENT
Start: 2025-04-18 | End: 2025-04-18 | Stop reason: HOSPADM

## 2025-04-18 RX ORDER — ONDANSETRON HYDROCHLORIDE 2 MG/ML
4 INJECTION, SOLUTION INTRAVENOUS EVERY 8 HOURS PRN
Status: CANCELLED | OUTPATIENT
Start: 2025-04-18

## 2025-04-18 RX ORDER — METOCLOPRAMIDE 10 MG/1
10 TABLET ORAL ONCE
Status: COMPLETED | OUTPATIENT
Start: 2025-04-18 | End: 2025-04-18

## 2025-04-18 RX ORDER — SODIUM CHLORIDE, SODIUM LACTATE, POTASSIUM CHLORIDE, CALCIUM CHLORIDE 600; 310; 30; 20 MG/100ML; MG/100ML; MG/100ML; MG/100ML
INJECTION, SOLUTION INTRAVENOUS CONTINUOUS PRN
Status: DISCONTINUED | OUTPATIENT
Start: 2025-04-18 | End: 2025-04-18

## 2025-04-18 RX ORDER — DIPHENHYDRAMINE HYDROCHLORIDE 50 MG/ML
12.5 INJECTION, SOLUTION INTRAMUSCULAR; INTRAVENOUS ONCE AS NEEDED
Status: DISCONTINUED | OUTPATIENT
Start: 2025-04-18 | End: 2025-04-18 | Stop reason: HOSPADM

## 2025-04-18 RX ORDER — ALBUTEROL SULFATE 0.83 MG/ML
2.5 SOLUTION RESPIRATORY (INHALATION) ONCE
Status: CANCELLED | OUTPATIENT
Start: 2025-04-18 | End: 2025-04-18

## 2025-04-18 RX ORDER — ALBUTEROL SULFATE 0.83 MG/ML
2.5 SOLUTION RESPIRATORY (INHALATION) ONCE AS NEEDED
Status: DISCONTINUED | OUTPATIENT
Start: 2025-04-18 | End: 2025-04-18 | Stop reason: HOSPADM

## 2025-04-18 RX ORDER — CEFAZOLIN SODIUM 2 G/100ML
INJECTION, SOLUTION INTRAVENOUS AS NEEDED
Status: DISCONTINUED | OUTPATIENT
Start: 2025-04-18 | End: 2025-04-18

## 2025-04-18 RX ORDER — ONDANSETRON HYDROCHLORIDE 2 MG/ML
INJECTION, SOLUTION INTRAVENOUS AS NEEDED
Status: DISCONTINUED | OUTPATIENT
Start: 2025-04-18 | End: 2025-04-18

## 2025-04-18 RX ORDER — LIDOCAINE HYDROCHLORIDE 20 MG/ML
INJECTION, SOLUTION EPIDURAL; INFILTRATION; INTRACAUDAL; PERINEURAL AS NEEDED
Status: DISCONTINUED | OUTPATIENT
Start: 2025-04-18 | End: 2025-04-18

## 2025-04-18 RX ORDER — FAMOTIDINE 20 MG/1
20 TABLET, FILM COATED ORAL ONCE
Status: COMPLETED | OUTPATIENT
Start: 2025-04-18 | End: 2025-04-18

## 2025-04-18 RX ORDER — ONDANSETRON 4 MG/1
4 TABLET, ORALLY DISINTEGRATING ORAL ONCE AS NEEDED
Status: CANCELLED | OUTPATIENT
Start: 2025-04-18

## 2025-04-18 RX ORDER — MIDAZOLAM HYDROCHLORIDE 1 MG/ML
1 INJECTION, SOLUTION INTRAMUSCULAR; INTRAVENOUS ONCE AS NEEDED
Status: DISCONTINUED | OUTPATIENT
Start: 2025-04-18 | End: 2025-04-18 | Stop reason: HOSPADM

## 2025-04-18 RX ORDER — MEPERIDINE HYDROCHLORIDE 25 MG/ML
12.5 INJECTION INTRAMUSCULAR; INTRAVENOUS; SUBCUTANEOUS EVERY 10 MIN PRN
Status: DISCONTINUED | OUTPATIENT
Start: 2025-04-18 | End: 2025-04-18 | Stop reason: HOSPADM

## 2025-04-18 RX ORDER — ONDANSETRON HYDROCHLORIDE 2 MG/ML
4 INJECTION, SOLUTION INTRAVENOUS ONCE AS NEEDED
Status: DISCONTINUED | OUTPATIENT
Start: 2025-04-18 | End: 2025-04-18 | Stop reason: HOSPADM

## 2025-04-18 RX ORDER — PROPOFOL 10 MG/ML
INJECTION, EMULSION INTRAVENOUS AS NEEDED
Status: DISCONTINUED | OUTPATIENT
Start: 2025-04-18 | End: 2025-04-18

## 2025-04-18 RX ORDER — FENTANYL CITRATE 50 UG/ML
INJECTION, SOLUTION INTRAMUSCULAR; INTRAVENOUS AS NEEDED
Status: DISCONTINUED | OUTPATIENT
Start: 2025-04-18 | End: 2025-04-18

## 2025-04-18 RX ORDER — LIDOCAINE HYDROCHLORIDE 10 MG/ML
0.1 INJECTION, SOLUTION INFILTRATION; PERINEURAL ONCE
Status: DISCONTINUED | OUTPATIENT
Start: 2025-04-18 | End: 2025-04-18 | Stop reason: HOSPADM

## 2025-04-18 RX ORDER — LABETALOL HYDROCHLORIDE 5 MG/ML
10 INJECTION, SOLUTION INTRAVENOUS ONCE AS NEEDED
Status: DISCONTINUED | OUTPATIENT
Start: 2025-04-18 | End: 2025-04-18 | Stop reason: HOSPADM

## 2025-04-18 RX ORDER — TRAMADOL HYDROCHLORIDE 50 MG/1
50 TABLET ORAL EVERY 8 HOURS PRN
Qty: 10 TABLET | Refills: 0 | Status: SHIPPED | OUTPATIENT
Start: 2025-04-18

## 2025-04-18 RX ADMIN — FENTANYL CITRATE 50 MCG: 50 INJECTION, SOLUTION INTRAMUSCULAR; INTRAVENOUS at 12:09

## 2025-04-18 RX ADMIN — METOCLOPRAMIDE 10 MG: 10 TABLET ORAL at 10:54

## 2025-04-18 RX ADMIN — FAMOTIDINE 20 MG: 20 TABLET, FILM COATED ORAL at 10:54

## 2025-04-18 RX ADMIN — ONDANSETRON 4 MG: 2 INJECTION, SOLUTION INTRAMUSCULAR; INTRAVENOUS at 12:48

## 2025-04-18 RX ADMIN — PROPOFOL 60 MG: 10 INJECTION, EMULSION INTRAVENOUS at 12:46

## 2025-04-18 RX ADMIN — SODIUM CHLORIDE, POTASSIUM CHLORIDE, SODIUM LACTATE AND CALCIUM CHLORIDE: 600; 310; 30; 20 INJECTION, SOLUTION INTRAVENOUS at 12:00

## 2025-04-18 RX ADMIN — DEXAMETHASONE SODIUM PHOSPHATE 4 MG: 4 INJECTION, SOLUTION INTRAMUSCULAR; INTRAVENOUS at 12:21

## 2025-04-18 RX ADMIN — CEFAZOLIN SODIUM 2 G: 2 INJECTION, SOLUTION INTRAVENOUS at 12:15

## 2025-04-18 RX ADMIN — PROPOFOL 140 MG: 10 INJECTION, EMULSION INTRAVENOUS at 12:09

## 2025-04-18 RX ADMIN — FENTANYL CITRATE 25 MCG: 50 INJECTION, SOLUTION INTRAMUSCULAR; INTRAVENOUS at 12:28

## 2025-04-18 RX ADMIN — FENTANYL CITRATE 25 MCG: 50 INJECTION, SOLUTION INTRAMUSCULAR; INTRAVENOUS at 12:27

## 2025-04-18 RX ADMIN — LIDOCAINE HYDROCHLORIDE 100 MG: 20 INJECTION, SOLUTION EPIDURAL; INFILTRATION; INTRACAUDAL; PERINEURAL at 12:09

## 2025-04-18 SDOH — HEALTH STABILITY: MENTAL HEALTH: CURRENT SMOKER: 0

## 2025-04-18 ASSESSMENT — PAIN - FUNCTIONAL ASSESSMENT
PAIN_FUNCTIONAL_ASSESSMENT: 0-10

## 2025-04-18 ASSESSMENT — PAIN SCALES - GENERAL
PAINLEVEL_OUTOF10: 0 - NO PAIN
PAINLEVEL_OUTOF10: 0 - NO PAIN
PAIN_LEVEL: 2
PAINLEVEL_OUTOF10: 0 - NO PAIN

## 2025-04-18 NOTE — ANESTHESIA POSTPROCEDURE EVALUATION
Patient: Nicole Samson    Procedure Summary       Date: 04/18/25 Room / Location: [facility] 03 / [facility]    Anesthesia Start: 1200 Anesthesia Stop: 1256    Procedure: Lumpectomy with Magseed Localization (Right: Breast) Diagnosis:       Invasive ductal carcinoma of breast, female, right      (Invasive ductal carcinoma of breast, female, right [C50.911])    Surgeons: Liv Wilson MD Responsible Provider: Brett Hightower MD    Anesthesia Type: general, other ASA Status: 3            Anesthesia Type: general, other    Vitals Value Taken Time   /72 04/18/25 13:31   Temp 36.2 °C (97.2 °F) 04/18/25 13:30   Pulse 55 04/18/25 13:35   Resp 14 04/18/25 13:35   SpO2 93 % 04/18/25 13:35   Vitals shown include unfiled device data.    Anesthesia Post Evaluation    Patient location during evaluation: PACU  Patient participation: complete - patient participated  Level of consciousness: sleepy but conscious  Pain score: 2  Pain management: adequate  Multimodal analgesia pain management approach  Airway patency: patent  Cardiovascular status: acceptable  Respiratory status: acceptable  Hydration status: acceptable  Postoperative Nausea and Vomiting: none        There were no known notable events for this encounter.

## 2025-04-18 NOTE — ANESTHESIA PREPROCEDURE EVALUATION
Patient: Nicole Samson    Procedure Information       Date/Time: 04/18/25 1145    Procedure: Lumpectomy with Magseed Localization (Right: Breast)    Location: [facility] / [facility]    Surgeons: Liv Wilson MD            Relevant Problems   Cardiac   (+) Other chest pain      Pulmonary   (+) Multiple pulmonary nodules      Neuro   (+) Bipolar 1 disorder (Multi)   (+) Depression      GI   (+) Dysphagia   (+) Gastroesophageal reflux disease      Endocrine   (+) Obesity      GYN   (+) Invasive ductal carcinoma of breast, female, right       Clinical information reviewed:                   NPO Detail:  No data recorded     Physical Exam    Airway  Mallampati: II  TM distance: >3 FB  Mouth opening: 3 or more finger widths     Cardiovascular   Rhythm: regular  Rate: normal     Dental - normal exam     Pulmonary Breath sounds clear to auscultation     Abdominal Abdomen: soft           Anesthesia Plan    History of general anesthesia?: unknown/emergency  History of complications of general anesthesia?: no    ASA 3     general and other   There is reasoning for not using neuraxial anesthesia or a peripheral nerve block.  (Labs acceptable, EKG, echo, stress test reviewed and Dr. Coley's evaluation low cardiac risk)  The patient is not a current smoker.  Education provided regarding risk of obstructive sleep apnea. (in appropriate circumstances)  intravenous induction   Anesthetic plan and risks discussed with patient.    Plan discussed with CRNA, CAA and attending.

## 2025-04-18 NOTE — DISCHARGE INSTRUCTIONS
No alcohol or driving for 24 hrs.  Notify MD for signs of infection like fevers, chills, increased pain or foul drainage.  You may shower in 2 days-take gauze off and keep steri strips in place,   They will fall off when they are ready, about 2-3 weeks  no tub baths, showers or swimming until MD says ok.  No lotions, ointments or creams to incision site.  Wear your surgical bra day and night for at least a week.  No heavy lifting, nothing greater than 10lbs.

## 2025-04-18 NOTE — POST-PROCEDURE NOTE
Patient brought back from PACU.  She is alert and awake.  Dressing to right breast is C/D/I, no drainage.  She denies any pain.  She is wearing a surgical bra.  Tolerated snack and gingerale.  Friend brought back to room.  Discharge instructions reviewed, verbalized understanding.

## 2025-04-18 NOTE — OP NOTE
Lumpectomy with Magseed Localization (R) Operative Note     Date: 2025  OR Location: [facility]    Name: Nicole Samson, : 1940, Age: 85 y.o., MRN: [id number], Sex: female    Diagnosis  Pre-op Diagnosis      * Invasive ductal carcinoma of breast, female, right [C50.911] Post-op Diagnosis     * Invasive ductal carcinoma of breast, female, right [C50.911]     Procedures  Lumpectomy with Magseed Localization  62748 - DE MASTECTOMY PARTIAL      Surgeons      * Liv Wilson - Primary    Resident/Fellow/Other Assistant:  Surgeons and Role:  * No surgeons found with a matching role *    Staff:   Circulator: Jocelyn Jones Person: Jose Juan Jones Person: Fartun    Anesthesia Staff: Anesthesiologist: Brett Hightower MD  C-AA: DYLAN Lynch    Procedure Summary  Anesthesia: General  ASA: III  Estimated Blood Loss: 1mL  Intra-op Medications:   Administrations occurring from 1145 to 1315 on 25:   Medication Name Total Dose   BUPivacaine HCl (Marcaine) 0.5 % (5 mg/mL) injection 5 mL   lidocaine-epinephrine (Xylocaine W/EPI) 1 %-1:100,000 injection 5 mL   ceFAZolin (Ancef) IV 2 g in 100 mL dextrose (iso) - premix 2 g   dexAMETHasone (Decadron) 4 mg/mL 4 mg   fentaNYL (Sublimaze) injection 50 mcg/mL 100 mcg   LR infusion Cannot be calculated   lidocaine PF (Xylocaine-MPF)  2 % 100 mg   ondansetron (Zofran) 2 mg/mL injection 4 mg   propofol (Diprivan) injection 10 mg/mL 200 mg              Anesthesia Record               Intraprocedure I/O Totals          Intake    ceFAZolin 2 gram/100 mL 100.00 mL    Total Intake 100 mL          Specimen:   ID Type Source Tests Collected by Time   1 : right breast lumpectomy with magseed; blue stitch=anterior; short=superior, long=lateral Tissue BREAST LUMPECTOMY RIGHT SURGICAL PATHOLOGY EXAM Liv Wilson MD 2025 1225                 Drains and/or Catheters: * None in log *    Tourniquet Times:         Implants:     Findings: Radiographic confirmation of  excision of the affected area    Indications: Nicole Samson is an 85 y.o. female who is having surgery for Invasive ductal carcinoma of breast, female, right [C50.911].     The patient was seen in the preoperative area. The risks, benefits, complications, treatment options, non-operative alternatives, expected recovery and outcomes were discussed with the patient. The possibilities of reaction to medication, pulmonary aspiration, injury to surrounding structures, bleeding, recurrent infection, the need for additional procedures, failure to diagnose a condition, and creating a complication requiring transfusion or operation were discussed with the patient. The patient concurred with the proposed plan, giving informed consent.  The site of surgery was properly noted/marked if necessary per policy. The patient has been actively warmed in preoperative area. Preoperative antibiotics have been ordered and given within 1 hours of incision. Venous thrombosis prophylaxis have been ordered including bilateral sequential compression devices    Procedure Details: Patient was brought to Room in the supine position after undergoingmagseed placement  into the  right breast on a previous day. General anesthesia was obtained with LMA. Right Breast was prepped and draped in a sterile fashion.  Operation then continued with lumpectomy.  Mag seed probe was placed on the breast.  We are able to localize the area of the mag seed lateral aspect of right breast.  Marking pen was used to delineate the line of incision in the outer slightly lower quadrant of the right breast. Local was infiltrated in the tissue.A first assistant needed to achieve adequate exposure during the dissection.  Fifteen blade scalpel was used to make a 4 centimeter incision in the skin.  The mag seed probe was placed into the incision and the direction of the seed was determined.   lesion was excised using electrocautery.  The mag seed probe was used quite often  to check the location of the seed during the excision.  After it was excised it was marked appropriately with a short stitch superior and long stitch lateral and a blue stitch anterior.  It was sent to the Radiology Department who confirmed the presence of the affected area. The lumpectomy cavity was then irrigated with water. Hemostasis maintained with electrocautery.  Deep tissues brought together with 3-0 Vicryl.  Incision was then closed using interrupted 3-0 Vicryl followed by a running 4-0 Monocryl. Steri-Strips and sterile dressings were applied patient tolerated procedure well LMA was removed in the operating room and she was transferred to recovery room with rails up all counts were good this concludes this dictation please send a copy to myself and to the patients primary care physician.    Complications:  None; patient tolerated the procedure well.    Disposition: PACU - hemodynamically stable.  Condition: stable                 Additional Details:     Attending Attestation: I was present and scrubbed for the entire procedure.    Liv Wilson  Phone Number: [phone]

## 2025-04-18 NOTE — ANESTHESIA PROCEDURE NOTES
Airway  Date/Time: 4/18/2025 12:11 PM  Reason: elective    Airway not difficult    Staffing  Performed: CAA   Authorized by: Tomi Hill DO    Performed by: DYLAN Lynch  Patient location during procedure: OR    Patient Condition  Indications for airway management: anesthesia  Patient position: sniffing  Sedation level: deep     Final Airway Details   Preoxygenated: yes  Final airway type: supraglottic airway  Successful airway: classic  Size: 3  Number of attempts at approach: 1    Additional Comments  EASY, ATRAUMATIC LMA PLACEMENT TO GOOD SEAL  SOFT BITE BLOCK PLACED

## 2025-04-28 NOTE — PROGRESS NOTES
"Subjective   Patient ID: Nicole Samson is a 85 y.o. female who presents for S/P Right Breast Lumpectomy with Magseed Localization 4/18/2025.  HPI  Patient returns to clinic and presents for S/P Right Breast Lumpectomy with Magseed Localization 4/18/2025.  Patient was last seen in clinic on 2/13/2025 for breast biopsy follow up-discuss results.   Findings: Radiographic confirmation of excision of the affected area   Surgical pathology was collected and finalized.    FINAL DIAGNOSIS   A. 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  -- Previous biopsy site changes.   Electronically signed by Shwetha Marlow MD on 4/29/2025 at 1028 EDT      [facility]   By the signature on this report, the individual or group listed as making the Final Interpretation/Diagnosis certifies that they have reviewed this case.    Comment  [facility]   : Dr. Duckworth (selected slides)   Case Summary Report   INVASIVE CARCINOMA OF THE BREAST: Resection   8th Edition - Protocol posted: 6/19/2024INVASIVE CARCINOMA OF THE BREAST: RESECTION - All Specimens  SPECIMEN   Procedure  Excision (less than total mastectomy)   Specimen Laterality  Right   TUMOR   Tumor Site  Not specified   Histologic Type  Invasive carcinoma of no special type (ductal)   Histologic Grade (Александр Histologic Score)     Glandular (Acinar) / Tubular Differentiation  Score 3   Nuclear Pleomorphism  Score 3   Mitotic Rate  Score 2   Overall Grade  Grade 3 (scores of 8 or 9)   Tumor Size  Greatest dimension of largest invasive focus (Millimeters): 10 mm   Tumor Focality  Single focus of invasive carcinoma   Ductal Carcinoma In Situ (DCIS)  Present     Negative for extensive intraductal component (EIC)   Architectural Patterns  Cribriform     Papillary     Solid   Nuclear Grade  Grade II (intermediate)   Necrosis  Present, central (expansive \"comedo\" necrosis)   Lymphatic and / or Vascular Invasion  " Cannot be determined   Dermal Lymphatic and / or Vascular Invasion  No skin present   Microcalcifications  Present in DCIS     Present in invasive carcinoma   Treatment Effect in the Breast  No known presurgical therapy   MARGINS   Margin Status for Invasive Carcinoma  All margins negative for invasive carcinoma   Distance from Invasive Carcinoma to Closest Margin  Greater than: 2 mm   Distance from Invasive Carcinoma to Other Margin(s)  All margins negative for invasive carcinoma (greater than 2 mm)   Margin Status for DCIS  All margins negative for DCIS   Distance from DCIS to Closest Margin  Less than: 1 mm   Closest Margin(s) to DCIS  Posterior   Distance from DCIS to Other Margin(s)  All other margins negative for DCIS by greater than 2 mm   REGIONAL LYMPH NODES   Regional Lymph Node Status  Not applicable (no regional lymph nodes submitted or found)   pTNM CLASSIFICATION (AJCC 8th Edition)   Reporting of pT, pN, and (when applicable) pM categories is based on information available to the pathologist at the time the report is issued. As per the AJCC (Chapter 1, 8th Ed.) it is the managing physician's responsibility to establish the final pathologic stage based upon all pertinent information, including but potentially not limited to this pathology report.   pT Category  pT1b   pN Category  pN not assigned (no nodes submitted or found)   SPECIAL STUDIES        Estrogen Receptor (ER) Status  Positive (greater than 10% of cells demonstrate nuclear positivity)   Percentage of Cells with Nuclear Positivity  %        Progesterone Receptor (PgR) Status  Negative        HER2 (by immunohistochemistry)  Negative (Score 1+)   Testing Performed on Case Number  [id number]   Comment(s)  E-Cadherin immunostain shows strong positive membranous staining on A4, supporting ductal phenotype. CK5/6 shows absent staining while ER shows diffuse nuclear staining in the DCIS on A4.   .      Block for Additional Biomarkers/Molecular  Studies  [facility]   Normal Block: A1  Tumor Block: A10, A13       Social History:  Tobacco Use - NO  Do you use any recreational drugs - NO  Alcohol Use - NO  Caffeine Intake - NO  Working - RETIRED  Marital status - SINGLE  Living with - SELF     Past Medical History:   Diagnosis Date    Bipolar disorder, unspecified (Multi) 05/04/2017    Psychosis, bipolar affective    Chest pain     Dysphagia     Pt was told her    Elevated coronary artery calcium score     Gastroparesis     GERD (gastroesophageal reflux disease)     History of falling 06/16/2017    History of fall    Personal history of colonic polyps 08/22/2019    History of colonic polyps    Personal history of other diseases of the digestive system 02/09/2016    History of gallstones    Personal history of other diseases of the digestive system 02/09/2016    History of hiatal hernia    Personal history of other malignant neoplasm of skin 02/09/2016    History of malignant neoplasm of skin    Pulmonary nodules     Pulmonary nodules     Vertigo      Family History   Problem Relation Name Age of Onset    Heart disease Mother      Dementia Brother       Past Surgical History:   Procedure Laterality Date    APPENDECTOMY  02/09/2016    Appendectomy    BREAST BIOPSY Right 01/28/2025    BREAST LUMPECTOMY Right 04/18/2025    with Magseed localization    CATARACT EXTRACTION Bilateral     COLONOSCOPY      CT ABDOMEN ANGIOGRAM W AND/OR WO IV CONTRAST  07/27/2013    CT ABDOMEN ANGIOGRAM W AND/OR WO IV CONTRAST [facility]    GASTRIC FUNDOPLICATION  2013    Esophagogastric Fundoplasty Nissen Fundoplication    HIP ARTHROPLASTY Bilateral     OTHER SURGICAL HISTORY  12/29/2019    Esophagogastroduodenoscopy    OTHER SURGICAL HISTORY  02/09/2016    Biopsy Skin    OTHER SURGICAL HISTORY      EGD with food impaction removal    TOTAL HIP ARTHROPLASTY  08/22/2019    Total Hip Replacement    UPPER GASTROINTESTINAL ENDOSCOPY       No Known Allergies    Review of Systems  BP (!)  "119/98 (BP Location: Right arm, Patient Position: Sitting, BP Cuff Size: Adult)   Pulse 82   Temp 36.3 °C (97.4 °F) (Temporal)   Resp 17   Ht 1.549 m (5' 1\")   Wt 73.5 kg (162 lb)   SpO2 98%   BMI 30.61 kg/m²    Objective   Physical Exam  Breast lumpectomy site healed nicely no signs of infection  Assessment/Plan   Problem List Items Addressed This Visit           ICD-10-CM    Invasive ductal carcinoma of breast, female, right - Primary C50.911    Patient status post lumpectomy for a 1 cm invasive ductal carcinoma ER/AK positive AK HER2/ingrid negative grade 3 out of 3 tumor.  Lymph nodes were not excised as patient would not pursue any chemotherapy.  We have clean margins on the invasive and noninvasive component.  Patient is being referred to oncology and radiation oncology.  Patient is to follow-up with me in 6 months.             Breast History:    Patient is new to clinic and presents for Right US breast bx consult, bx 1/28 .  Patient is referred by Dr. Adilene Corrales.  Patient had BI Mammo right diagnostic tomosynthesis and BI US breast limited right completed on 1/3/2025. Impression was suspicious group of calcifications with a small associated soft issue density right breast 9 o'clock position 6 cm from the nipple with a maximum diameter 6 mm. 1 lymph node within the right axilla demonstrates an absence of a fatty hilum. Biopsy of this may be appropriate as well. Need for biopsy was discussed with the patient at the time of the study. Due to results order was placed for biopsy and consult with Dr. Wilson per Dr. Corrales.  Patient with increasingly abnormal microcalcifications right breast 9 o'clock position that on ultrasound is a 5 mm lesion. Recommend ultrasound-guided core biopsy.Patient to be scheduled for ultrasound guided biopsy of the breast in the radiology department. Risk, benefits and alternatives to this plan were thoroughly discussed with the patient who agrees to proceed. The procedure was " also thoroughly discussed as well as her follow-up. She is to see me in the office in one week but I also will call as soon as I see the pathology which is usually 4 working days from procedure.   Patient with a right axillary lymph node with loss of fatty hilum.  Recommend ultrasound-guided core biopsy of the lymph node iat the same time as the breast biopsy.  Patient returns to clinic and presents for right breast biopsy follow up-discuss results.  Patient was last seen in clinic on 1/16/2025 for Right US breast bx consult, bx 1/28.   Patient has been complaining of some chest discomfort that radiates to her neck.  She has had a couple episodes.  Surgical pathology was collected and finalized.  Patient with a 5 mm invasive ductal carcinoma of the right breast. ER positive TX HER2/ingrid negative grade 2 out of 3. Long discussion was held with the patient concerning her treatment options. At this time she is agreeable to Magseed localization lumpectomy of the right breast. A discussion was held concerning doing a sentinel node biopsy. We both agree that with this small tumor and considering it she would not proceed with chemotherapy we will not be doing a sentinel node biopsy. Patient was also informed she likely would not be a candidate for radiation therapy. We did discuss however no current therapy of which she is agreeable. Risk-benefit alternatives of doing a Magseed localization lumpectomy right breast were thoroughly discussed with the patient agrees to proceed patient is not a candidate for MRI. Is not a candidate for genetic testing.   Patient read first 2 episodes of chest pain radiating to the neck. Patient has a cardiologist Dr. Coley. Recommend she see Dr. Coley prior to surgery.   Previous Biopsy: NO Menarche Age: 14 Age of first delivery: N/A Breastfeed: N/A Menopause age: LATE 40S HRT: NO Family history of breast cancer: NO Family history of ovarian cancer: NO Family history of pancreatic cancer: NO  G 0 P 0     Loan Virk MA 05/02/25 9:22 AM

## 2025-04-29 LAB
LAB AP ASR DISCLAIMER: NORMAL
LAB AP BLOCK FOR ADDITIONAL STUDIES: NORMAL
LABORATORY COMMENT REPORT: NORMAL
PATH REPORT.COMMENTS IMP SPEC: NORMAL
PATH REPORT.FINAL DX SPEC: NORMAL
PATH REPORT.GROSS SPEC: NORMAL
PATH REPORT.RELEVANT HX SPEC: NORMAL
PATH REPORT.TOTAL CANCER: NORMAL
PATHOLOGY SYNOPTIC REPORT: NORMAL

## 2025-05-02 ENCOUNTER — OFFICE VISIT (OUTPATIENT)
Dept: SURGERY | Facility: CLINIC | Age: 85
End: 2025-05-02
Payer: MEDICARE

## 2025-05-02 VITALS
OXYGEN SATURATION: 98 % | TEMPERATURE: 97.4 F | WEIGHT: 162 LBS | BODY MASS INDEX: 30.58 KG/M2 | SYSTOLIC BLOOD PRESSURE: 119 MMHG | HEIGHT: 61 IN | RESPIRATION RATE: 17 BRPM | HEART RATE: 82 BPM | DIASTOLIC BLOOD PRESSURE: 98 MMHG

## 2025-05-02 DIAGNOSIS — C50.911 INVASIVE DUCTAL CARCINOMA OF BREAST, FEMALE, RIGHT: Primary | ICD-10-CM

## 2025-05-02 PROCEDURE — 1123F ACP DISCUSS/DSCN MKR DOCD: CPT | Performed by: SURGERY

## 2025-05-02 PROCEDURE — 1036F TOBACCO NON-USER: CPT | Performed by: SURGERY

## 2025-05-02 PROCEDURE — 1159F MED LIST DOCD IN RCRD: CPT | Performed by: SURGERY

## 2025-05-02 PROCEDURE — 1126F AMNT PAIN NOTED NONE PRSNT: CPT | Performed by: SURGERY

## 2025-05-02 PROCEDURE — 99211 OFF/OP EST MAY X REQ PHY/QHP: CPT | Performed by: SURGERY

## 2025-05-02 ASSESSMENT — COLUMBIA-SUICIDE SEVERITY RATING SCALE - C-SSRS
2. HAVE YOU ACTUALLY HAD ANY THOUGHTS OF KILLING YOURSELF?: NO
1. IN THE PAST MONTH, HAVE YOU WISHED YOU WERE DEAD OR WISHED YOU COULD GO TO SLEEP AND NOT WAKE UP?: NO
6. HAVE YOU EVER DONE ANYTHING, STARTED TO DO ANYTHING, OR PREPARED TO DO ANYTHING TO END YOUR LIFE?: NO

## 2025-05-02 ASSESSMENT — LIFESTYLE VARIABLES
SKIP TO QUESTIONS 9-10: 1
AUDIT-C TOTAL SCORE: 0
HOW OFTEN DO YOU HAVE A DRINK CONTAINING ALCOHOL: NEVER
HOW OFTEN DO YOU HAVE SIX OR MORE DRINKS ON ONE OCCASION: NEVER
HOW MANY STANDARD DRINKS CONTAINING ALCOHOL DO YOU HAVE ON A TYPICAL DAY: PATIENT DOES NOT DRINK

## 2025-05-02 ASSESSMENT — PAIN SCALES - GENERAL: PAINLEVEL_OUTOF10: 0-NO PAIN

## 2025-05-02 ASSESSMENT — ENCOUNTER SYMPTOMS
LOSS OF SENSATION IN FEET: 0
OCCASIONAL FEELINGS OF UNSTEADINESS: 0
DEPRESSION: 0

## 2025-05-02 ASSESSMENT — PATIENT HEALTH QUESTIONNAIRE - PHQ9
SUM OF ALL RESPONSES TO PHQ9 QUESTIONS 1 & 2: 0
2. FEELING DOWN, DEPRESSED OR HOPELESS: NOT AT ALL
1. LITTLE INTEREST OR PLEASURE IN DOING THINGS: NOT AT ALL

## 2025-05-02 NOTE — ASSESSMENT & PLAN NOTE
Patient status post lumpectomy for a 1 cm invasive ductal carcinoma ER/AZ positive AZ HER2/ingrid negative grade 3 out of 3 tumor.  Lymph nodes were not excised as patient would not pursue any chemotherapy.  We have clean margins on the invasive and noninvasive component.  Patient is being referred to oncology and radiation oncology.  Patient is to follow-up with me in 6 months.  At time we will discuss further imaging.  Patient was inquiring about evaluation of the lymph nodes with imaging in light of us not doing a sentinel node biopsy.

## 2025-05-09 ENCOUNTER — PATIENT OUTREACH (OUTPATIENT)
Dept: HEMATOLOGY/ONCOLOGY | Facility: CLINIC | Age: 85
End: 2025-05-09
Payer: MEDICARE

## 2025-05-09 SDOH — HEALTH STABILITY: PHYSICAL HEALTH: ON AVERAGE, HOW MANY DAYS PER WEEK DO YOU ENGAGE IN MODERATE TO STRENUOUS EXERCISE (LIKE A BRISK WALK)?: 4 DAYS

## 2025-05-09 SDOH — ECONOMIC STABILITY: FOOD INSECURITY: WITHIN THE PAST 12 MONTHS, YOU WORRIED THAT YOUR FOOD WOULD RUN OUT BEFORE YOU GOT MONEY TO BUY MORE.: NEVER TRUE

## 2025-05-09 SDOH — ECONOMIC STABILITY: INCOME INSECURITY: IN THE LAST 12 MONTHS, WAS THERE A TIME WHEN YOU WERE NOT ABLE TO PAY THE MORTGAGE OR RENT ON TIME?: NO

## 2025-05-09 SDOH — HEALTH STABILITY: PHYSICAL HEALTH: ON AVERAGE, HOW MANY MINUTES DO YOU ENGAGE IN EXERCISE AT THIS LEVEL?: 20 MIN

## 2025-05-09 SDOH — ECONOMIC STABILITY: TRANSPORTATION INSECURITY
IN THE PAST 12 MONTHS, HAS LACK OF TRANSPORTATION KEPT YOU FROM MEETINGS, WORK, OR FROM GETTING THINGS NEEDED FOR DAILY LIVING?: NO

## 2025-05-09 SDOH — ECONOMIC STABILITY: TRANSPORTATION INSECURITY
IN THE PAST 12 MONTHS, HAS THE LACK OF TRANSPORTATION KEPT YOU FROM MEDICAL APPOINTMENTS OR FROM GETTING MEDICATIONS?: NO

## 2025-05-09 SDOH — ECONOMIC STABILITY: FOOD INSECURITY: WITHIN THE PAST 12 MONTHS, THE FOOD YOU BOUGHT JUST DIDN'T LAST AND YOU DIDN'T HAVE MONEY TO GET MORE.: NEVER TRUE

## 2025-05-09 SDOH — ECONOMIC STABILITY: GENERAL
WHICH OF THE FOLLOWING DO YOU KNOW HOW TO USE AND HAVE ACCESS TO EVERY DAY? (CHOOSE ALL THAT APPLY): DESKTOP COMPUTER, LAPTOP COMPUTER, OR TABLET WITH BROADBAND INTERNET CONNECTION

## 2025-05-09 ASSESSMENT — SOCIAL DETERMINANTS OF HEALTH (SDOH)
HOW HARD IS IT FOR YOU TO PAY FOR THE VERY BASICS LIKE FOOD, HOUSING, MEDICAL CARE, AND HEATING?: NOT HARD AT ALL
IN A TYPICAL WEEK, HOW MANY TIMES DO YOU TALK ON THE PHONE WITH FAMILY, FRIENDS, OR NEIGHBORS?: MORE THAN THREE TIMES A WEEK
WITHIN THE LAST YEAR, HAVE YOU BEEN HUMILIATED OR EMOTIONALLY ABUSED IN OTHER WAYS BY YOUR PARTNER OR EX-PARTNER?: NO
WITHIN THE LAST YEAR, HAVE TO BEEN RAPED OR FORCED TO HAVE ANY KIND OF SEXUAL ACTIVITY BY YOUR PARTNER OR EX-PARTNER?: NO
WITHIN THE LAST YEAR, HAVE YOU BEEN AFRAID OF YOUR PARTNER OR EX-PARTNER?: NO
HOW OFTEN DO YOU GET TOGETHER WITH FRIENDS OR RELATIVES?: THREE TIMES A WEEK
IN THE PAST 12 MONTHS, HAS THE ELECTRIC, GAS, OIL, OR WATER COMPANY THREATENED TO SHUT OFF SERVICE IN YOUR HOME?: NO
HOW OFTEN DO YOU ATTENT MEETINGS OF THE CLUB OR ORGANIZATION YOU BELONG TO?: 1 TO 4 TIMES PER YEAR
ARE YOU MARRIED, WIDOWED, DIVORCED, SEPARATED, NEVER MARRIED, OR LIVING WITH A PARTNER?: NEVER MARRIED
WITHIN THE LAST YEAR, HAVE YOU BEEN KICKED, HIT, SLAPPED, OR OTHERWISE PHYSICALLY HURT BY YOUR PARTNER OR EX-PARTNER?: NO
DO YOU BELONG TO ANY CLUBS OR ORGANIZATIONS SUCH AS CHURCH GROUPS UNIONS, FRATERNAL OR ATHLETIC GROUPS, OR SCHOOL GROUPS?: YES
HOW OFTEN DO YOU ATTEND CHURCH OR RELIGIOUS SERVICES?: MORE THAN 4 TIMES PER YEAR

## 2025-05-09 NOTE — PROGRESS NOTES
Radiation Oncology Outpatient Consult    Patient Name:  Nicole Samson  MRN:  [id number]  :  1940    Referring Provider: Liv Wilson MD  Primary Care Provider: Adilene Corrales MD  Care Team: Patient Care Team:  Adilene Corrales MD as PCP - General (Family Medicine)  Adilene Corrales MD as PCP - [employer] PCP  Venkatesh Cedeno MD as Medical Oncologist (Hematology and Oncology)    Date of Service: 2025     SUBJECTIVE  Diagnosis: Invasive ductal carcinoma of breast, female, right, Clinical: Stage IB (cT1b, cN0, cM0, G3, ER+, MA-, HER2-)  Invasive ductal carcinoma of breast, female, right, Pathologic: Stage Unknown (pT1b, pNX, cM0, G3, ER+, MA-, HER2-)    Previous Treatments:  2025: right breast lumpectomy alone    History of Present Illness:  Ms. Samson is a 85 y.o. woman who had a routine screening mammogram on 3/12/2024 that showed new punctate calcifications in the lateral aspect of the right breast. Diagnostic mammogram on 2024 showed an area of loosely grouped microcalcifications in the central lateral right breast, felt to be probably benign with recommendation for short-term follow-up mammogram. Diagnostic mammogram and ultrasound of the right breast was performed on 1/3/2025, which showed a a cluster of calcifications in the right breast, at the 9 o'clock position, 7 mm in greatest diameter. On ultrasound, there was a 3 x 4 x 5 mm nodule with microcalcifications in the right breast at the 9 o'clock position, 6 cm from the nipple. There was a right axillary node which measured up to 6 mm in size without a fatty hilum. Ultrasound-guided biopsy of the right breast mass was performed on 2025, with pathology positive for invasive ductal carcinoma, grade 2, ER positive, MA negative, HER2/ingrid negative. DCIS was seen as well, intermediate grade.The axilla was reexamined at the time of biopsy, with no suspicious lymphadenopathy noted. She then underwent a right  breast lumpectomy alone on 4/18/2025, which showed a 1.0 cm invasive ductal carcinoma, grade 3, with negative margins greater than 2 mm. There was LVI was unable to be determined. DCIS component was removed as well, grade 2, with expansive necrosis, with negative margins (closest was less than 1 mm, posterior).     Currently, she overall feels well. She denies any breast pain or discomfort. She denies any breast masses or lesions. She denies any bleeding or wound difficulties.      The patient was seen for an opinion regarding radiation options for the diagnosis above. I personally reviewed the available outside records and imaging studies as detailed in the HPI. The allergies, medications, past medical history, surgical history, social history, family history, and review of systems were reviewed.     Prior Radiotherapy:  No radiation treatments to show. (Treatments may have been administered in another system.)       Past Medical History:  Medical History[1]     Past Surgical History:  Surgical History[2]     Family History:  Cancer-related family history is not on file.    Social History:  Social History[3]    Allergies:  Allergies[4]     Medications:  Current Medications[5]      Review of Systems:  Review of Systems - Oncology    Performance Status:  The Karnofsky performance scale today is 90, Able to carry on normal activity; minor signs or symptoms of disease (ECOG equivalent 0).        OBJECTIVE  /75   Pulse 77   Temp 35.9 °C (96.6 °F) (Temporal)   Resp 18   Wt 74.9 kg (165 lb 2 oz)   SpO2 94%   BMI 32.42 kg/m²    Physical Exam  Vitals reviewed.   Constitutional:       General: She is not in acute distress.     Appearance: Normal appearance. She is not ill-appearing.   HENT:      Head: Normocephalic and atraumatic.      Mouth/Throat:      Mouth: Mucous membranes are moist.   Eyes:      Conjunctiva/sclera: Conjunctivae normal.   Cardiovascular:      Rate and Rhythm: Normal rate.   Pulmonary:       Effort: Pulmonary effort is normal.   Abdominal:      General: There is no distension.   Musculoskeletal:         General: Normal range of motion.   Skin:     General: Skin is warm and dry.   Neurological:      Mental Status: She is alert and oriented to person, place, and time.   Psychiatric:         Mood and Affect: Mood normal.         Behavior: Behavior normal.          Laboratory Review:  There are no laboratory contraindications to radiation therapy.    The pertinent lab results were reviewed and discussed with the patient.  Notably, per HPI      Pathology Review:  The pertinent pathology results were reviewed and discussed with the patient.  Notably, per HPI     Imaging:  The pertinent imaging results were reviewed and discussed with the patient.  Notably, per HPI       ASSESSMENT:   Ms. Samson is a 85 y.o. woman with a diagnosis of Invasive ductal carcinoma of breast, female, right, Clinical: Stage IB (cT1b, cN0, cM0, G3, ER+, DC-, HER2-)  Invasive ductal carcinoma of breast, female, right, Pathologic: Stage Unknown (pT1b, pNX, cM0, G3, ER+, DC-, HER2-), status post lumpectomy alone, here for a discussion of adjuvant radiation treatment.      PLAN:   We had an extensive discussion regarding role of radiation in this setting.     We reviewed the standard of care for post-lumpectomy treatment for early stage breast cancer, which would include adjuvant radiotherapy per national guidelines. This would consist of hypofractionated radiotherapy, to a dose of 40.05 Gy in 15 fractions. We also discussed the possibility of treating her with ultra-hypofractionated whole breast radiation, to a dose of 26 Gy in 5 fractions. We explained that while long-term follow-up data is limited, the currently available data is encouraging, and it would be reasonable to consider in this setting. Lastly, we also discussed the possibility of radiation omission if she were to take endocrine therapy alone. However, given her grade 3  disease and excellent performance status, we would favor treatment in this setting. Additionally, with grade 3 disease and close DCIS margins, we would also consider a boost to the tumor bed to further decrease her risk of in-breast recurrence.     We discussed the acute side effects of therapies and potential late toxicities. During the course of radiation, acute side effects could include, but are not limited to fatigue, dermatitis, or chest wall discomfort. The typical timeline for the resolution of these effects as well as available supportive therapies were reviewed. Potential long term side effects can include, but are not limited to, fibrosis, rib fractures, radiation pneumonitis, and a small risk of second malignancies.     After the discussion, the patient was given the opportunity to ask questions which were subsequently answered, and our contact information was given. At this time, she declined to proceed with any adjuvant radiation, but was in agreement to start endocrine therapy and follow-up with medical oncology.    Please contact our department with any further questions regarding the plan of management.    The length of the visit was 45 minutes. We spent more than 50% of this time discussing treatment options with the patient.    Recommendations:  - Ultra-hypofractionated whole breast radiation to a dose of 26 Gy in 5 fractions, followed by a single 5.2 Gy lumpectomy bed boost; declined adjuvant radiation, and will proceed with endocrine therapy alone and follow with medical oncology  -Follow-up with radiation oncology as needed  NCCN Guidelines were applicable to guide this patients treatment plan.   Lupe Kaufman DO            [1]   Past Medical History:  Diagnosis Date    Bipolar disorder, unspecified (Multi) 05/04/2017    Psychosis, bipolar affective    Breast cancer     Chest pain     Dysphagia     Pt was told her    Elevated coronary artery calcium score     Gastroparesis     GERD  (gastroesophageal reflux disease)     History of falling 06/16/2017    History of fall    Personal history of colonic polyps 08/22/2019    History of colonic polyps    Personal history of other diseases of the digestive system 02/09/2016    History of gallstones    Personal history of other diseases of the digestive system 02/09/2016    History of hiatal hernia    Personal history of other malignant neoplasm of skin 02/09/2016    History of malignant neoplasm of skin    Pulmonary nodules     Pulmonary nodules     Vertigo    [2]   Past Surgical History:  Procedure Laterality Date    APPENDECTOMY  02/09/2016    Appendectomy    BREAST BIOPSY Right 01/28/2025    BREAST LUMPECTOMY Right 04/18/2025    with Magseed localization    CATARACT EXTRACTION Bilateral     COLONOSCOPY      CT ABDOMEN ANGIOGRAM W AND/OR WO IV CONTRAST  07/27/2013    CT ABDOMEN ANGIOGRAM W AND/OR WO IV CONTRAST [facility]    GASTRIC FUNDOPLICATION  2013    Esophagogastric Fundoplasty Nissen Fundoplication    HIP ARTHROPLASTY Bilateral     OTHER SURGICAL HISTORY  12/29/2019    Esophagogastroduodenoscopy    OTHER SURGICAL HISTORY  02/09/2016    Biopsy Skin    OTHER SURGICAL HISTORY      EGD with food impaction removal    TOTAL HIP ARTHROPLASTY  08/22/2019    Total Hip Replacement    UPPER GASTROINTESTINAL ENDOSCOPY     [3]   Social History  Tobacco Use    Smoking status: Never     Passive exposure: Never    Smokeless tobacco: Never   Vaping Use    Vaping status: Never Used   Substance Use Topics    Alcohol use: Not Currently    Drug use: Never   [4] No Known Allergies  [5]   Current Outpatient Medications:     ARIPiprazole (Abilify) 2 mg tablet, Take 1 tablet (2 mg) by mouth once daily., Disp: , Rfl:     aspirin 81 mg EC tablet, Take 1 tablet (81 mg) by mouth once daily., Disp: , Rfl:     buPROPion XL (Wellbutrin XL) 300 mg 24 hr tablet, Take 1 tablet (300 mg) by mouth once daily in the morning. Take before meals., Disp: , Rfl:     lamoTRIgine  (LaMICtal) 200 mg tablet, Take 1 tablet (200 mg) by mouth once daily at bedtime., Disp: , Rfl:     lamoTRIgine (LaMICtal) 25 mg tablet, Take 1 tablet (25 mg) by mouth., Disp: , Rfl:     letrozole (Femara) 2.5 mg tablet, Take 1 tablet (2.5 mg total) by mouth once daily.  Take with or without food., Disp: 90 tablet, Rfl: 3    metoprolol succinate XL (Toprol-XL) 50 mg 24 hr tablet, Take 1 tablet (50 mg) by mouth once daily. Do not crush or chew., Disp: 90 tablet, Rfl: 3    multivitamin tablet, Take 1 tablet by mouth once daily., Disp: , Rfl:     QUEtiapine (SEROquel) 100 mg tablet, Take 1 tablet (100 mg) by mouth once daily at bedtime., Disp: , Rfl:     rosuvastatin (Crestor) 20 mg tablet, Take 1 tablet (20 mg) by mouth once daily., Disp: 90 tablet, Rfl: 3    traMADol (Ultram) 50 mg tablet, Take 1 tablet (50 mg) by mouth every 8 hours if needed for severe pain (7 - 10). (Patient not taking: Reported on 5/2/2025), Disp: 10 tablet, Rfl: 0

## 2025-05-09 NOTE — PROGRESS NOTES
Outreach completed. 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  Address and contacts reviewed.  Teach back done.

## 2025-05-12 ENCOUNTER — OFFICE VISIT (OUTPATIENT)
Dept: HEMATOLOGY/ONCOLOGY | Facility: CLINIC | Age: 85
End: 2025-05-12
Payer: MEDICARE

## 2025-05-12 VITALS
DIASTOLIC BLOOD PRESSURE: 79 MMHG | WEIGHT: 163.47 LBS | BODY MASS INDEX: 32.09 KG/M2 | HEIGHT: 60 IN | OXYGEN SATURATION: 95 % | TEMPERATURE: 97 F | RESPIRATION RATE: 18 BRPM | HEART RATE: 93 BPM | SYSTOLIC BLOOD PRESSURE: 150 MMHG

## 2025-05-12 DIAGNOSIS — C50.911 INVASIVE DUCTAL CARCINOMA OF BREAST, FEMALE, RIGHT: ICD-10-CM

## 2025-05-12 PROCEDURE — 1159F MED LIST DOCD IN RCRD: CPT | Performed by: INTERNAL MEDICINE

## 2025-05-12 PROCEDURE — 1126F AMNT PAIN NOTED NONE PRSNT: CPT | Performed by: INTERNAL MEDICINE

## 2025-05-12 PROCEDURE — 99205 OFFICE O/P NEW HI 60 MIN: CPT | Performed by: INTERNAL MEDICINE

## 2025-05-12 PROCEDURE — 99215 OFFICE O/P EST HI 40 MIN: CPT | Performed by: INTERNAL MEDICINE

## 2025-05-12 PROCEDURE — 1160F RVW MEDS BY RX/DR IN RCRD: CPT | Performed by: INTERNAL MEDICINE

## 2025-05-12 RX ORDER — LETROZOLE 2.5 MG/1
2.5 TABLET, FILM COATED ORAL DAILY
Qty: 90 TABLET | Refills: 3 | Status: SHIPPED | OUTPATIENT
Start: 2025-05-12 | End: 2026-05-07

## 2025-05-12 ASSESSMENT — PAIN SCALES - GENERAL: PAINLEVEL_OUTOF10: 0-NO PAIN

## 2025-05-12 NOTE — PROGRESS NOTES
Patient ID: Nicole Samson is a 85 y.o. female.  Referring Physician: Liv Wilson MD  [id number] [facility] Physician Kyle Issa [address] [place], [place] [id number]  Primary Care Provider: Adilene Corrales MD  Referral Reason: breast cancer    Subjective:      Heme/Onc History:  Patient was last seen in clinic on 1/16/2025 for Right US breast bx consult, bx 1/28.   Patient has been complaining of some chest discomfort that radiates to her neck.  She has had a couple episodes.     Surgical pathology was collected and ifnalized          FINAL DIAGNOSIS   A. Right breast 9:00 6 cm from nipple, ultrasound guided core needle biopsy:      -- Invasive ductal carcinoma, grade 2, see note.  -- Ductal carcinoma in situ, intermediate nuclear grade, cribriform pattern with necrosis and calcifications.     Note:  In this limited sample, the invasive carcinoma measures up to 4.5 mm in greatest dimension. E-cadherin immunostain is positive in tumor cells and supports ductal phenotype.  See synoptic biomarker summary.           Past Medical History: Medical History[1]  Social History:   Social History     Socioeconomic History    Marital status:      Spouse name: Not on file    Number of children: Not on file    Years of education: Not on file    Highest education level: Not on file   Occupational History    Not on file   Tobacco Use    Smoking status: Never     Passive exposure: Never    Smokeless tobacco: Never   Vaping Use    Vaping status: Never Used   Substance and Sexual Activity    Alcohol use: Not Currently    Drug use: Never    Sexual activity: Not on file   Other Topics Concern    Not on file   Social History Narrative    Not on file     Social Drivers of Health     Financial Resource Strain: Low Risk  (5/9/2025)    Overall Financial Resource Strain (CARDIA)     Difficulty of Paying Living Expenses: Not hard at all   Food Insecurity: No Food Insecurity (5/9/2025)    Hunger Vital Sign     Worried About Running Out  of Food in the Last Year: Never true     Ran Out of Food in the Last Year: Never true   Transportation Needs: No Transportation Needs (5/9/2025)    PRAPARE - Transportation     Lack of Transportation (Medical): No     Lack of Transportation (Non-Medical): No   Physical Activity: Insufficiently Active (5/9/2025)    Exercise Vital Sign     Days of Exercise per Week: 4 days     Minutes of Exercise per Session: 20 min   Stress: No Stress Concern Present (5/9/2025)    [employer] - Occupational Stress Questionnaire     Feeling of Stress : Only a little   Social Connections: Moderately Integrated (5/9/2025)    Social Connection and Isolation Panel [NHANES]     Frequency of Communication with Friends and Family: More than three times a week     Frequency of Social Gatherings with Friends and Family: Three times a week     Attends Synagogue Services: More than 4 times per year     Active Member of Clubs or Organizations: Yes     Attends Club or Organization Meetings: 1 to 4 times per year     Marital Status: Never    Intimate Partner Violence: Not At Risk (5/9/2025)    Humiliation, Afraid, Rape, and Kick questionnaire     Fear of Current or Ex-Partner: No     Emotionally Abused: No     Physically Abused: No     Sexually Abused: No   Housing Stability: Unknown (5/9/2025)    Housing Stability Vital Sign     Unable to Pay for Housing in the Last Year: No     Number of Times Moved in the Last Year: Not on file     Homeless in the Last Year: No     Surgical History: Surgical History[2]  Family History: Family History[3]   reports that she has never smoked. She has never been exposed to tobacco smoke. She has never used smokeless tobacco.  Oncology Family history: Cancer-related family history is not on file.    Review Of Systems:  As stated per in HPI; otherwise all other 12 point ROS are negative    Physical Exam:  /79 (BP Location: Left arm, Patient Position: Sitting, BP Cuff Size:  "Adult long)   Pulse 93   Temp 36.1 °C (97 °F) (Temporal)   Resp 18   Ht (S) 1.52 m (4' 11.84\")   Wt 74.2 kg (163 lb 7.5 oz)   SpO2 95%   BMI 32.09 kg/m²   BSA: 1.77 meters squared  General: awake/alert/oriented x3, no distress, alert and cooperative  Head: Short hair fully covering scalp. Symmetric facial expressions  Eyes: PERRL, EOMI, clear sclera, eyebrows present.  Ears/Nose/Mouth/Throat:  Oral mucous membranes moist. No oral ulcers. No palpable pre/post-auricular lymph nodes  Neck: No palpable cervical chain lymph nodes  Respiratory: unlabored breathing on room air, good chest expansion, thorax symmetric  Cardio: Regular rate and rhythm, normal S1 and S2, radial pulses symmetric  GI: Nondistended, soft, non-tender abdomen  Musculoskeletal: Normal muscle bulk and tone, ROM intact, no joint swelling.  Rises from chair and walks unassisted.  Extremities: No ankle swelling, no arm or leg wounds  Neuro: Alert, cognition intact, speech normal. Facial expressions symmetric.  No motor deficits noted. Sensation intact to touch and hot/cold.   Able to stand from seated position unassisted and walks around the room unassisted.  Psychological: Appropriate mood and behavior.  Skin: Warm and dry, no lesions, no rashes    Results:  Diagnostic Results   Lab Results   Component Value Date    WBC 5.3 03/26/2025    HGB 14.4 03/26/2025    HCT 44.0 03/26/2025    MCV 84.0 03/26/2025     03/26/2025     Lab Results   Component Value Date    CALCIUM 9.6 03/26/2025     03/26/2025    K 4.4 03/26/2025    CO2 29 03/26/2025     03/26/2025    BUN 15 03/26/2025    CREATININE 0.93 03/26/2025    ALT 14 03/26/2025    AST 18 03/26/2025     Current Medications[4]     Assessment/Plan:  ? Breast cancer:    Patient dx with a biopsy proven with a 5 mm invasive ductal carcinoma of the right breast. ER positive ME HER2/ingrid negative grade 2 out of 3.     She had lumpectomy on 04/18/25 which showed an additional invasive foci of " 1 cm, it was grade 3 a/w DCIS.    FINAL DIAGNOSIS   A. 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  -- Previous biopsy site changes.     Letrozole 2.5 mg every day for 5 years is planned    Breast RT is planned by Dr. Kaufman    Calcium and Vit D is recommended. DEXA is WNL except forearm osteopenia. Will repeat DEXA in 2026    MMG is due in 01/2026.     She will see DR. Wilson for breast exam in 11/24 for breast exam. She did not want to come here for another breast exam. She wanted to do Telemed in 6 months for toxicity check.    Diagnoses and all orders for this visit:  Invasive ductal carcinoma of breast, female, right  -     Referral To Hematology and Oncology       Performance Status: Asymptomatic    I spent more than 60 minutes for the patient today, including face-to-face conversation, pre-visit preparation, post-visit orders, and others.   Venkatesh Cedeno MD                                [1]   Past Medical History:  Diagnosis Date    Bipolar disorder, unspecified (Multi) 05/04/2017    Psychosis, bipolar affective    Chest pain     Dysphagia     Pt was told her    Elevated coronary artery calcium score     Gastroparesis     GERD (gastroesophageal reflux disease)     History of falling 06/16/2017    History of fall    Personal history of colonic polyps 08/22/2019    History of colonic polyps    Personal history of other diseases of the digestive system 02/09/2016    History of gallstones    Personal history of other diseases of the digestive system 02/09/2016    History of hiatal hernia    Personal history of other malignant neoplasm of skin 02/09/2016    History of malignant neoplasm of skin    Pulmonary nodules     Pulmonary nodules     Vertigo    [2]   Past Surgical History:  Procedure Laterality Date    APPENDECTOMY  02/09/2016    Appendectomy    BREAST BIOPSY Right 01/28/2025    BREAST LUMPECTOMY Right 04/18/2025    with Magseed localization     CATARACT EXTRACTION Bilateral     COLONOSCOPY      CT ABDOMEN ANGIOGRAM W AND/OR WO IV CONTRAST  07/27/2013    CT ABDOMEN ANGIOGRAM W AND/OR WO IV CONTRAST [facility]    GASTRIC FUNDOPLICATION  2013    Esophagogastric Fundoplasty Nissen Fundoplication    HIP ARTHROPLASTY Bilateral     OTHER SURGICAL HISTORY  12/29/2019    Esophagogastroduodenoscopy    OTHER SURGICAL HISTORY  02/09/2016    Biopsy Skin    OTHER SURGICAL HISTORY      EGD with food impaction removal    TOTAL HIP ARTHROPLASTY  08/22/2019    Total Hip Replacement    UPPER GASTROINTESTINAL ENDOSCOPY     [3]   Family History  Problem Relation Name Age of Onset    Heart disease Mother      Dementia Brother     [4]   Current Outpatient Medications:     ARIPiprazole (Abilify) 2 mg tablet, Take 1 tablet (2 mg) by mouth once daily., Disp: , Rfl:     aspirin 81 mg EC tablet, Take 1 tablet (81 mg) by mouth once daily., Disp: , Rfl:     buPROPion XL (Wellbutrin XL) 300 mg 24 hr tablet, Take 1 tablet (300 mg) by mouth once daily in the morning. Take before meals., Disp: , Rfl:     lamoTRIgine (LaMICtal) 200 mg tablet, Take 1 tablet (200 mg) by mouth once daily at bedtime., Disp: , Rfl:     lamoTRIgine (LaMICtal) 25 mg tablet, Take 1 tablet (25 mg) by mouth., Disp: , Rfl:     metoprolol succinate XL (Toprol-XL) 50 mg 24 hr tablet, Take 1 tablet (50 mg) by mouth once daily. Do not crush or chew., Disp: 90 tablet, Rfl: 3    multivitamin tablet, Take 1 tablet by mouth once daily., Disp: , Rfl:     QUEtiapine (SEROquel) 100 mg tablet, Take 1 tablet (100 mg) by mouth once daily at bedtime., Disp: , Rfl:     rosuvastatin (Crestor) 20 mg tablet, Take 1 tablet (20 mg) by mouth once daily., Disp: 90 tablet, Rfl: 3    traMADol (Ultram) 50 mg tablet, Take 1 tablet (50 mg) by mouth every 8 hours if needed for severe pain (7 - 10). (Patient not taking: Reported on 5/2/2025), Disp: 10 tablet, Rfl: 0

## 2025-05-12 NOTE — PROGRESS NOTES
Patient here for new pt visit with Dr Venkatesh Cedeno for Dx of breast cancer   Patient here alone    Medications and Allergies reviewed and reconciled this visit by MD per her request     No concerns or complaints noted at this time.     Pt reports appetite is   Dizziness: denies   Bleeding: denies   PICA: denies   Fevers/Chills/weight loss/night sweats: denies       Follow up per  MD  request.    Pt. reports availability and use of mychart, Reviewed this is a good place to communicate with the team as well as review labs and upcoming orders.     No barriers to education noted, patient agrees to current plan and verbalized understanding using teach back method.

## 2025-05-13 ENCOUNTER — SOCIAL WORK (OUTPATIENT)
Dept: CASE MANAGEMENT | Facility: HOSPITAL | Age: 85
End: 2025-05-13
Payer: MEDICARE

## 2025-05-13 ENCOUNTER — HOSPITAL ENCOUNTER (OUTPATIENT)
Dept: RADIATION ONCOLOGY | Facility: CLINIC | Age: 85
Setting detail: RADIATION/ONCOLOGY SERIES
Discharge: HOME | End: 2025-05-13
Payer: MEDICARE

## 2025-05-13 VITALS
RESPIRATION RATE: 18 BRPM | DIASTOLIC BLOOD PRESSURE: 75 MMHG | HEART RATE: 77 BPM | TEMPERATURE: 96.6 F | OXYGEN SATURATION: 94 % | SYSTOLIC BLOOD PRESSURE: 135 MMHG | BODY MASS INDEX: 32.42 KG/M2 | WEIGHT: 165.12 LBS

## 2025-05-13 DIAGNOSIS — C50.911 INVASIVE DUCTAL CARCINOMA OF BREAST, FEMALE, RIGHT: ICD-10-CM

## 2025-05-13 PROCEDURE — 99214 OFFICE O/P EST MOD 30 MIN: CPT | Performed by: STUDENT IN AN ORGANIZED HEALTH CARE EDUCATION/TRAINING PROGRAM

## 2025-05-13 PROCEDURE — 99204 OFFICE O/P NEW MOD 45 MIN: CPT | Performed by: STUDENT IN AN ORGANIZED HEALTH CARE EDUCATION/TRAINING PROGRAM

## 2025-05-13 ASSESSMENT — PATIENT HEALTH QUESTIONNAIRE - PHQ9
2. FEELING DOWN, DEPRESSED OR HOPELESS: NOT AT ALL
1. LITTLE INTEREST OR PLEASURE IN DOING THINGS: NOT AT ALL
SUM OF ALL RESPONSES TO PHQ9 QUESTIONS 1 AND 2: 0

## 2025-05-13 ASSESSMENT — COLUMBIA-SUICIDE SEVERITY RATING SCALE - C-SSRS
6. HAVE YOU EVER DONE ANYTHING, STARTED TO DO ANYTHING, OR PREPARED TO DO ANYTHING TO END YOUR LIFE?: NO
2. HAVE YOU ACTUALLY HAD ANY THOUGHTS OF KILLING YOURSELF?: NO
1. IN THE PAST MONTH, HAVE YOU WISHED YOU WERE DEAD OR WISHED YOU COULD GO TO SLEEP AND NOT WAKE UP?: NO

## 2025-05-13 ASSESSMENT — ENCOUNTER SYMPTOMS
LOSS OF SENSATION IN FEET: 0
DEPRESSION: 0
OCCASIONAL FEELINGS OF UNSTEADINESS: 0

## 2025-05-13 ASSESSMENT — PAIN SCALES - GENERAL: PAINLEVEL_OUTOF10: 0-NO PAIN

## 2025-05-13 NOTE — PROGRESS NOTES
JANE met with patient while she was here for a consultation with radiation oncology. She met yesterday with medical oncology. Her cancer was discovered in a mammogram. She has had surgery. She has determined that she will have no active treatment but has agreed to an AI which she will attempt to take for 5 years.   She is from [place], [place]. She reports good support thru many friends. She is independent in all areas including driving. Her distress score yesterday was 3, today she scored a 0. She has no questions or needs at this time. JANE provided a business card and will remain available for future needs.

## 2025-05-13 NOTE — PROGRESS NOTES
Radiation Oncology Nursing Note    Prior Radiotherapy:  No  Radiation Treatments       No radiation treatments to show. (Treatments may have been administered in another system.)          Current Systemic Treatment:  No     Presence of Pacemaker or ICD:  No    History of Autoimmune or Connective Tissue Disorders:  No    Pain: The patient's current pain level was assessed.  They report currently having a pain of 0 out of 10.  They feel their pain is under control without the use of pain medications.    Review of Systems:  Review of Systems   All other systems reviewed and are negative.    Education Documentation  When and How to Contact Clinic, taught by Chung Berman RN at 5/13/2025  9:59 AM.  Learner: Patient  Readiness: Acceptance  Method: Explanation  Response: Verbalizes Understanding    Education Comments  No comments found.      Patient has decided against radiation therapy at this time. Dr. Kaufman's contact card given with instruction to call this office with questions or if she changes her mind, patient demonstrated understanding with verbal teach back.

## 2025-06-06 ENCOUNTER — APPOINTMENT (OUTPATIENT)
Dept: CARDIOLOGY | Facility: CLINIC | Age: 85
End: 2025-06-06
Payer: MEDICARE

## 2025-06-12 ENCOUNTER — OFFICE VISIT (OUTPATIENT)
Dept: CARDIOLOGY | Facility: CLINIC | Age: 85
End: 2025-06-12
Payer: MEDICARE

## 2025-06-12 VITALS
DIASTOLIC BLOOD PRESSURE: 82 MMHG | HEIGHT: 59 IN | BODY MASS INDEX: 32.86 KG/M2 | WEIGHT: 163 LBS | OXYGEN SATURATION: 95 % | SYSTOLIC BLOOD PRESSURE: 136 MMHG | HEART RATE: 89 BPM

## 2025-06-12 DIAGNOSIS — E78.5 HYPERLIPIDEMIA, UNSPECIFIED HYPERLIPIDEMIA TYPE: Primary | ICD-10-CM

## 2025-06-12 DIAGNOSIS — R93.1 ABNORMAL HEART SCORE CT: ICD-10-CM

## 2025-06-12 PROCEDURE — 1159F MED LIST DOCD IN RCRD: CPT | Performed by: NURSE PRACTITIONER

## 2025-06-12 PROCEDURE — G2211 COMPLEX E/M VISIT ADD ON: HCPCS | Performed by: NURSE PRACTITIONER

## 2025-06-12 PROCEDURE — 1036F TOBACCO NON-USER: CPT | Performed by: NURSE PRACTITIONER

## 2025-06-12 PROCEDURE — 1160F RVW MEDS BY RX/DR IN RCRD: CPT | Performed by: NURSE PRACTITIONER

## 2025-06-12 PROCEDURE — 99214 OFFICE O/P EST MOD 30 MIN: CPT | Performed by: NURSE PRACTITIONER

## 2025-06-12 PROCEDURE — 99212 OFFICE O/P EST SF 10 MIN: CPT | Performed by: NURSE PRACTITIONER

## 2025-06-12 RX ORDER — ROSUVASTATIN CALCIUM 10 MG/1
10 TABLET, COATED ORAL DAILY
Qty: 90 TABLET | Refills: 3 | Status: SHIPPED | OUTPATIENT
Start: 2025-06-12 | End: 2026-06-12

## 2025-06-12 ASSESSMENT — ENCOUNTER SYMPTOMS
CARDIOVASCULAR NEGATIVE: 1
NEUROLOGICAL NEGATIVE: 1
DEPRESSION: 0
MUSCULOSKELETAL NEGATIVE: 1
CONSTITUTIONAL NEGATIVE: 1
RESPIRATORY NEGATIVE: 1
GASTROINTESTINAL NEGATIVE: 1

## 2025-06-12 NOTE — PROGRESS NOTES
"Chief Complaint:   Follow-up    History Of Present Illness:    .Ms Samson returns in follow up.  Denies chest pain, sob, palpitations, or pedal edema.   Long discussion about metoprolol uses and statin use.  Only agreeable to try very low dose rosuvastatin.              Last Recorded Vitals:  Blood pressure 136/82, pulse 89, height 1.499 m (4' 11\"), weight 73.9 kg (163 lb), SpO2 95%.     Past Medical History:  Medical History[1]     Past Surgical History:  Surgical History[2]    Social History:  Social History[3]    Family History:  Family History[4]      Allergies:  Patient has no known allergies.    Outpatient Medications:  Current Medications[5]     Physical Exam:  Cardiovascular:      PMI at left midclavicular line. Normal rate. Regular rhythm. Normal S1. Normal S2.       Murmurs: There is no murmur.      No gallop.  No click. No rub.   Pulses:     Intact distal pulses.   Edema:     Peripheral edema absent.       ROS:  Review of Systems   Constitutional: Negative.   Cardiovascular: Negative.    Respiratory: Negative.     Skin: Negative.    Musculoskeletal: Negative.    Gastrointestinal: Negative.    Genitourinary: Negative.    Neurological: Negative.           Last Labs: reviewed  CBC -  Lab Results   Component Value Date    WBC 5.3 03/26/2025    HGB 14.4 03/26/2025    HCT 44.0 03/26/2025    MCV 84.0 03/26/2025     03/26/2025       CMP -  Lab Results   Component Value Date    CALCIUM 9.6 03/26/2025    PROT 6.6 03/26/2025    ALBUMIN 4.1 03/26/2025    AST 18 03/26/2025    ALT 14 03/26/2025    ALKPHOS 90 03/26/2025    BILITOT 0.4 03/26/2025       LIPID PANEL -   Lab Results   Component Value Date    CHOL 193 03/26/2025    TRIG 139 03/26/2025    HDL 52 03/26/2025    CHHDL 3.7 03/26/2025    LDLF 106 (H) 04/14/2022    VLDL 36 03/12/2024    NHDL 141 (H) 03/26/2025       RENAL FUNCTION PANEL -   Lab Results   Component Value Date    GLUCOSE 105 (H) 03/26/2025     03/26/2025    K 4.4 03/26/2025     " 03/26/2025    CO2 29 03/26/2025    ANIONGAP 6 (L) 03/26/2025    BUN 15 03/26/2025    CREATININE 0.93 03/26/2025    CALCIUM 9.6 03/26/2025    ALBUMIN 4.1 03/26/2025        Lab Results   Component Value Date    HGBA1C 5.5 03/26/2025         Assessment/Plan   Problem List Items Addressed This Visit    None    Assessment:     1. Coronary artery disease. The patient is a very pleasant 82-year-old white female whose past medical history is negative for hypertension diabetes and hyperlipidemia. Her most recent lipid panel included a cholesterol of 170  HDL 47 triglyceride 85. She also has been a lifetime non-smoker. Recently however the patient did have a CT coronary calcium score performed on 6/13/2022 with a total value of 718 placing her in the higher risk category. The patient denies any anginal type chest discomfort. Her EKG shows sinus rhythm low QRS voltage and poor R wave progression in precordial plane. Given the findings of her CT coronary calcium score she will be started on low-dose aspirin 81 mg daily and atorvastatin 20 mg daily. She had a negative lexiscan done 11/2022. She will be scheduled for a pharmacological nuclear stress test in the near future.  Lexiscan was negative for ischemia 02/2025. Declines metoprolol.   Echo 03/2025     CONCLUSIONS:   1. The left ventricular systolic function is normal, with a visually estimated ejection fraction of 60-65%.   2. Spectral Doppler shows an abnormal pattern of left ventricular diastolic filling.   3. The left atrium is mildly dilated.   4. Aortic valve sclerosis.   5. There is mild concentric left ventricular hypertrophy.     2. Minimal hyperlipidemia. As noted above the patient's untreated lipid panel is satisfactory under most situations. However given her elevated CT coronary calcium score she will be started on atorvastatin 20 mg daily.  Stopped atorvastatin and was placed on rosuvastatin 20 mg, but stopped.  Agreeable to try 10 mg daily.     3. S/p  bilateral total hip replacements.     4. Status post hiatal hernia repair.     5. Ascending thoracic aortic aneurysm. The patient's CT coronary calcium score also demonstrated mild aneurysmal dilatation of the ascending thoracic aorta measuring 4.3 cm. Mild cardiomegaly also noted. Patient's will also be scheduled to have a echocardiogram.          Chantell Silva, APRN-CNP       [1]   Past Medical History:  Diagnosis Date    Bipolar disorder, unspecified (Multi) 05/04/2017    Psychosis, bipolar affective    Breast cancer     Chest pain     Dysphagia     Pt was told her    Elevated coronary artery calcium score     Gastroparesis     GERD (gastroesophageal reflux disease)     History of falling 06/16/2017    History of fall    Personal history of colonic polyps 08/22/2019    History of colonic polyps    Personal history of other diseases of the digestive system 02/09/2016    History of gallstones    Personal history of other diseases of the digestive system 02/09/2016    History of hiatal hernia    Personal history of other malignant neoplasm of skin 02/09/2016    History of malignant neoplasm of skin    Pulmonary nodules     Pulmonary nodules     Vertigo    [2]   Past Surgical History:  Procedure Laterality Date    APPENDECTOMY  02/09/2016    Appendectomy    BREAST BIOPSY Right 01/28/2025    BREAST LUMPECTOMY Right 04/18/2025    with Magseed localization    CATARACT EXTRACTION Bilateral     COLONOSCOPY      CT ABDOMEN ANGIOGRAM W AND/OR WO IV CONTRAST  07/27/2013    CT ABDOMEN ANGIOGRAM W AND/OR WO IV CONTRAST [facility]    GASTRIC FUNDOPLICATION  2013    Esophagogastric Fundoplasty Nissen Fundoplication    HIP ARTHROPLASTY Bilateral     OTHER SURGICAL HISTORY  12/29/2019    Esophagogastroduodenoscopy    OTHER SURGICAL HISTORY  02/09/2016    Biopsy Skin    OTHER SURGICAL HISTORY      EGD with food impaction removal    TOTAL HIP ARTHROPLASTY  08/22/2019    Total Hip Replacement    UPPER GASTROINTESTINAL  ENDOSCOPY     [3]   Social History  Socioeconomic History    Marital status:    Tobacco Use    Smoking status: Never     Passive exposure: Never    Smokeless tobacco: Never   Vaping Use    Vaping status: Never Used   Substance and Sexual Activity    Alcohol use: Not Currently    Drug use: Never     Social Drivers of Health     Financial Resource Strain: Low Risk  (5/9/2025)    Overall Financial Resource Strain (CARDIA)     Difficulty of Paying Living Expenses: Not hard at all   Food Insecurity: No Food Insecurity (5/9/2025)    Hunger Vital Sign     Worried About Running Out of Food in the Last Year: Never true     Ran Out of Food in the Last Year: Never true   Transportation Needs: No Transportation Needs (5/9/2025)    PRAPARE - Transportation     Lack of Transportation (Medical): No     Lack of Transportation (Non-Medical): No   Physical Activity: Insufficiently Active (5/9/2025)    Exercise Vital Sign     Days of Exercise per Week: 4 days     Minutes of Exercise per Session: 20 min   Stress: No Stress Concern Present (5/9/2025)    [employer] - Occupational Stress Questionnaire     Feeling of Stress : Only a little   Social Connections: Moderately Integrated (5/9/2025)    Social Connection and Isolation Panel [NHANES]     Frequency of Communication with Friends and Family: More than three times a week     Frequency of Social Gatherings with Friends and Family: Three times a week     Attends Yarsanism Services: More than 4 times per year     Active Member of Clubs or Organizations: Yes     Attends Club or Organization Meetings: 1 to 4 times per year     Marital Status: Never    Intimate Partner Violence: Not At Risk (5/9/2025)    Humiliation, Afraid, Rape, and Kick questionnaire     Fear of Current or Ex-Partner: No     Emotionally Abused: No     Physically Abused: No     Sexually Abused: No   Housing Stability: Unknown (5/9/2025)    Housing Stability Vital Sign     Unable  to Pay for Housing in the Last Year: No     Homeless in the Last Year: No   [4]   Family History  Problem Relation Name Age of Onset    Heart disease Mother      Dementia Brother     [5]   Current Outpatient Medications   Medication Sig Dispense Refill    ARIPiprazole (Abilify) 2 mg tablet Take 1 tablet (2 mg) by mouth once daily.      aspirin 81 mg EC tablet Take 1 tablet (81 mg) by mouth once daily.      buPROPion XL (Wellbutrin XL) 300 mg 24 hr tablet Take 1 tablet (300 mg) by mouth once daily in the morning. Take before meals.      lamoTRIgine (LaMICtal) 200 mg tablet Take 1 tablet (200 mg) by mouth once daily at bedtime.      lamoTRIgine (LaMICtal) 25 mg tablet Take 1 tablet (25 mg) by mouth.      multivitamin tablet Take 1 tablet by mouth once daily.      QUEtiapine (SEROquel) 100 mg tablet Take 1 tablet (100 mg) by mouth once daily at bedtime.      letrozole (Femara) 2.5 mg tablet Take 1 tablet (2.5 mg total) by mouth once daily.  Take with or without food. (Patient not taking: Reported on 6/12/2025) 90 tablet 3    metoprolol succinate XL (Toprol-XL) 50 mg 24 hr tablet Take 1 tablet (50 mg) by mouth once daily. Do not crush or chew. (Patient not taking: Reported on 6/12/2025) 90 tablet 3    rosuvastatin (Crestor) 20 mg tablet Take 1 tablet (20 mg) by mouth once daily. (Patient not taking: Reported on 6/12/2025) 90 tablet 3    traMADol (Ultram) 50 mg tablet Take 1 tablet (50 mg) by mouth every 8 hours if needed for severe pain (7 - 10). (Patient not taking: Reported on 6/12/2025) 10 tablet 0     No current facility-administered medications for this visit.

## 2025-07-10 ENCOUNTER — TELEPHONE (OUTPATIENT)
Dept: CARDIOLOGY | Facility: CLINIC | Age: 85
End: 2025-07-10
Payer: MEDICARE

## 2025-07-13 ENCOUNTER — APPOINTMENT (OUTPATIENT)
Dept: RADIOLOGY | Facility: HOSPITAL | Age: 85
End: 2025-07-13
Payer: MEDICARE

## 2025-07-13 ENCOUNTER — APPOINTMENT (OUTPATIENT)
Dept: CARDIOLOGY | Facility: HOSPITAL | Age: 85
End: 2025-07-13
Payer: MEDICARE

## 2025-07-13 ENCOUNTER — HOSPITAL ENCOUNTER (EMERGENCY)
Facility: HOSPITAL | Age: 85
Discharge: HOME | End: 2025-07-13
Payer: MEDICARE

## 2025-07-13 VITALS
OXYGEN SATURATION: 98 % | DIASTOLIC BLOOD PRESSURE: 112 MMHG | WEIGHT: 150 LBS | HEART RATE: 74 BPM | BODY MASS INDEX: 27.6 KG/M2 | HEIGHT: 62 IN | SYSTOLIC BLOOD PRESSURE: 171 MMHG | TEMPERATURE: 97.1 F | RESPIRATION RATE: 16 BRPM

## 2025-07-13 DIAGNOSIS — R42 EPISODE OF DIZZINESS: Primary | ICD-10-CM

## 2025-07-13 DIAGNOSIS — S09.90XA CLOSED HEAD INJURY, INITIAL ENCOUNTER: ICD-10-CM

## 2025-07-13 DIAGNOSIS — S00.83XA CONTUSION OF FACE, INITIAL ENCOUNTER: ICD-10-CM

## 2025-07-13 LAB
ALBUMIN SERPL BCP-MCNC: 4 G/DL (ref 3.4–5)
ALP SERPL-CCNC: 99 U/L (ref 33–136)
ALT SERPL W P-5'-P-CCNC: 13 U/L (ref 7–45)
ANION GAP SERPL CALC-SCNC: 10 MMOL/L (ref 10–20)
APPEARANCE UR: CLEAR
AST SERPL W P-5'-P-CCNC: 14 U/L (ref 9–39)
BASOPHILS # BLD AUTO: 0.03 X10*3/UL (ref 0–0.1)
BASOPHILS NFR BLD AUTO: 0.6 %
BILIRUB SERPL-MCNC: 0.4 MG/DL (ref 0–1.2)
BILIRUB UR STRIP.AUTO-MCNC: NEGATIVE MG/DL
BUN SERPL-MCNC: 14 MG/DL (ref 6–23)
CALCIUM SERPL-MCNC: 9.6 MG/DL (ref 8.6–10.3)
CARDIAC TROPONIN I PNL SERPL HS: 5 NG/L (ref 0–13)
CARDIAC TROPONIN I PNL SERPL HS: 7 NG/L (ref 0–13)
CHLORIDE SERPL-SCNC: 106 MMOL/L (ref 98–107)
CO2 SERPL-SCNC: 26 MMOL/L (ref 21–32)
COLOR UR: COLORLESS
CREAT SERPL-MCNC: 0.91 MG/DL (ref 0.5–1.05)
EGFRCR SERPLBLD CKD-EPI 2021: 63 ML/MIN/1.73M*2
EOSINOPHIL # BLD AUTO: 0.1 X10*3/UL (ref 0–0.4)
EOSINOPHIL NFR BLD AUTO: 1.9 %
ERYTHROCYTE [DISTWIDTH] IN BLOOD BY AUTOMATED COUNT: 13.2 % (ref 11.5–14.5)
GLUCOSE SERPL-MCNC: 103 MG/DL (ref 74–99)
GLUCOSE UR STRIP.AUTO-MCNC: NORMAL MG/DL
HCT VFR BLD AUTO: 43.1 % (ref 36–46)
HGB BLD-MCNC: 14 G/DL (ref 12–16)
IMM GRANULOCYTES # BLD AUTO: 0.02 X10*3/UL (ref 0–0.5)
IMM GRANULOCYTES NFR BLD AUTO: 0.4 % (ref 0–0.9)
KETONES UR STRIP.AUTO-MCNC: NEGATIVE MG/DL
LEUKOCYTE ESTERASE UR QL STRIP.AUTO: NEGATIVE
LYMPHOCYTES # BLD AUTO: 1.38 X10*3/UL (ref 0.8–3)
LYMPHOCYTES NFR BLD AUTO: 26.8 %
MAGNESIUM SERPL-MCNC: 2.16 MG/DL (ref 1.6–2.4)
MCH RBC QN AUTO: 27.7 PG (ref 26–34)
MCHC RBC AUTO-ENTMCNC: 32.5 G/DL (ref 32–36)
MCV RBC AUTO: 85 FL (ref 80–100)
MONOCYTES # BLD AUTO: 0.35 X10*3/UL (ref 0.05–0.8)
MONOCYTES NFR BLD AUTO: 6.8 %
NEUTROPHILS # BLD AUTO: 3.27 X10*3/UL (ref 1.6–5.5)
NEUTROPHILS NFR BLD AUTO: 63.5 %
NITRITE UR QL STRIP.AUTO: NEGATIVE
NRBC BLD-RTO: 0 /100 WBCS (ref 0–0)
PH UR STRIP.AUTO: 7 [PH]
PLATELET # BLD AUTO: 223 X10*3/UL (ref 150–450)
POTASSIUM SERPL-SCNC: 3.9 MMOL/L (ref 3.5–5.3)
PROT SERPL-MCNC: 6.6 G/DL (ref 6.4–8.2)
PROT UR STRIP.AUTO-MCNC: NEGATIVE MG/DL
RBC # BLD AUTO: 5.05 X10*6/UL (ref 4–5.2)
RBC # UR STRIP.AUTO: NEGATIVE MG/DL
SODIUM SERPL-SCNC: 138 MMOL/L (ref 136–145)
SP GR UR STRIP.AUTO: 1
UROBILINOGEN UR STRIP.AUTO-MCNC: NORMAL MG/DL
WBC # BLD AUTO: 5.2 X10*3/UL (ref 4.4–11.3)

## 2025-07-13 PROCEDURE — 70450 CT HEAD/BRAIN W/O DYE: CPT

## 2025-07-13 PROCEDURE — 84484 ASSAY OF TROPONIN QUANT: CPT | Performed by: PHYSICIAN ASSISTANT

## 2025-07-13 PROCEDURE — 70450 CT HEAD/BRAIN W/O DYE: CPT | Performed by: STUDENT IN AN ORGANIZED HEALTH CARE EDUCATION/TRAINING PROGRAM

## 2025-07-13 PROCEDURE — 96361 HYDRATE IV INFUSION ADD-ON: CPT

## 2025-07-13 PROCEDURE — 2500000004 HC RX 250 GENERAL PHARMACY W/ HCPCS (ALT 636 FOR OP/ED): Performed by: PHYSICIAN ASSISTANT

## 2025-07-13 PROCEDURE — 93005 ELECTROCARDIOGRAM TRACING: CPT

## 2025-07-13 PROCEDURE — 81003 URINALYSIS AUTO W/O SCOPE: CPT | Performed by: PHYSICIAN ASSISTANT

## 2025-07-13 PROCEDURE — 76377 3D RENDER W/INTRP POSTPROCES: CPT | Performed by: STUDENT IN AN ORGANIZED HEALTH CARE EDUCATION/TRAINING PROGRAM

## 2025-07-13 PROCEDURE — 96360 HYDRATION IV INFUSION INIT: CPT

## 2025-07-13 PROCEDURE — 36415 COLL VENOUS BLD VENIPUNCTURE: CPT | Performed by: PHYSICIAN ASSISTANT

## 2025-07-13 PROCEDURE — 80053 COMPREHEN METABOLIC PANEL: CPT | Performed by: PHYSICIAN ASSISTANT

## 2025-07-13 PROCEDURE — 70486 CT MAXILLOFACIAL W/O DYE: CPT | Performed by: STUDENT IN AN ORGANIZED HEALTH CARE EDUCATION/TRAINING PROGRAM

## 2025-07-13 PROCEDURE — 70486 CT MAXILLOFACIAL W/O DYE: CPT

## 2025-07-13 PROCEDURE — 85025 COMPLETE CBC W/AUTO DIFF WBC: CPT | Performed by: PHYSICIAN ASSISTANT

## 2025-07-13 PROCEDURE — 83735 ASSAY OF MAGNESIUM: CPT | Performed by: PHYSICIAN ASSISTANT

## 2025-07-13 PROCEDURE — 76377 3D RENDER W/INTRP POSTPROCES: CPT

## 2025-07-13 PROCEDURE — 99285 EMERGENCY DEPT VISIT HI MDM: CPT | Mod: 25

## 2025-07-13 RX ADMIN — SODIUM CHLORIDE 1000 ML: 0.9 INJECTION, SOLUTION INTRAVENOUS at 14:12

## 2025-07-13 ASSESSMENT — PAIN SCALES - GENERAL
PAINLEVEL_OUTOF10: 0 - NO PAIN
PAINLEVEL_OUTOF10: 2

## 2025-07-13 ASSESSMENT — PAIN - FUNCTIONAL ASSESSMENT: PAIN_FUNCTIONAL_ASSESSMENT: 0-10

## 2025-07-13 NOTE — ED TRIAGE NOTES
Pt ambulatory to ED c/o dizziness that began this morning, states she is only dizzy when standing, mechanical fall on Friday, (-) blood thinners, (-) LOC, reports no injury from the fall

## 2025-07-13 NOTE — ED PROVIDER NOTES
HPI   Chief Complaint   Patient presents with    Dizziness    Fall       85-year-old female with past medical history positive for invasive ductal carcinoma hyperlipidemia GERD bipolar and vertigo had been ambulating in a parking lot 2 days ago when she fell with bruising to the left side of her face this morning when she went to stand up she was having intractable dizziness the episode lasted from 7 to 11 AM, then resolved but she came into the ER to be evaluated    Symptoms were present or worse when she went to stand up and with any rapid movements while standing  To have a history of vertigo in the past when asked if it was like her previous vertigo patient states unsure    NIH on initial eval was 0      History provided by:  Patient          Patient History   Medical History[1]  Surgical History[2]  Family History[3]  Social History[4]    Physical Exam   ED Triage Vitals   Temperature Heart Rate Respirations BP   07/13/25 1252 07/13/25 1252 07/13/25 1252 07/13/25 1252   36.1 °C (97 °F) 83 18 148/82      Pulse Ox Temp Source Heart Rate Source Patient Position   07/13/25 1252 07/13/25 1252 07/13/25 1435 07/13/25 1435   95 % Temporal Monitor Sitting      BP Location FiO2 (%)     07/13/25 1435 --     Left arm        Physical Exam  Vitals and nursing note reviewed.   Constitutional:       General: She is not in acute distress.     Appearance: She is well-developed.   HENT:      Head: Normocephalic.      Comments: Left side face with ecchymosis to the left cheek and inferior periorbital area       Right Ear: Tympanic membrane, ear canal and external ear normal.      Left Ear: Tympanic membrane, ear canal and external ear normal.      Nose: Nose normal.      Mouth/Throat:      Mouth: Mucous membranes are moist.      Pharynx: Oropharynx is clear. No oropharyngeal exudate or posterior oropharyngeal erythema.   Eyes:      Extraocular Movements: Extraocular movements intact.      Conjunctiva/sclera: Conjunctivae normal.       Pupils: Pupils are equal, round, and reactive to light.      Comments: No orbital rim bony abnormality no step-offs   Neck:      Vascular: No carotid bruit.   Cardiovascular:      Rate and Rhythm: Normal rate and regular rhythm.      Heart sounds: No murmur heard.  Pulmonary:      Effort: Pulmonary effort is normal. No respiratory distress.      Breath sounds: Normal breath sounds.   Abdominal:      Palpations: Abdomen is soft.      Tenderness: There is no abdominal tenderness.   Musculoskeletal:         General: No swelling. Normal range of motion.      Cervical back: Normal range of motion and neck supple. No tenderness.   Skin:     General: Skin is warm and dry.      Capillary Refill: Capillary refill takes less than 2 seconds.   Neurological:      General: No focal deficit present.      Mental Status: She is alert and oriented to person, place, and time.   Psychiatric:         Mood and Affect: Mood normal.           ED Course & MDM   Diagnoses as of 07/13/25 1746   Episode of dizziness   Closed head injury, initial encounter   Contusion of face, initial encounter                 No data recorded     Tj Coma Scale Score: 15 (07/13/25 1311 : Shamika Borden RN)                           Medical Decision Making  Differential:   1) dizziness-transient   2) closed head injury   3) facial contusion    85-year-old female here with a transient episode of dizziness that started at 7 AM and went till 11 AM symptoms were present when she would go to stand and with any rapid head movements she decided to come in because of the fall 2 days prior but the time she came in the dizziness had resolved denied any vision changes no numbness tingling, no weakness CT of the head and facial bones were obtained and showed no acute abnormality lab work including cardiac testing urinalysis showed no acute abnormality, NIH x 2 initially when she came in and prior to discharge were both 0  Patient okay to be discharged home but  advised if the dizziness starts back up if she has any vision changes any other symptoms develop she needs to get rechecked       Plan: Discussed differential with the patient and /or parents family   Patient to  follow up with the PCP in the next 2-3 days  Return for any worsening symptoms or go to the ER for further evaluation. Patient/family/caregiver  understands return   precautions and discharge instructions and is agreeable to the current plan   Impression: Transient dizziness-resolved, contusion to the face, closed head injury        Orders and Diagnoses for this visit        Procedure  Procedures       [1]   Past Medical History:  Diagnosis Date    Bipolar disorder, unspecified (Multi) 05/04/2017    Psychosis, bipolar affective    Breast cancer     Chest pain     Dysphagia     Pt was told her    Elevated coronary artery calcium score     Gastroparesis     GERD (gastroesophageal reflux disease)     History of falling 06/16/2017    History of fall    Personal history of colonic polyps 08/22/2019    History of colonic polyps    Personal history of other diseases of the digestive system 02/09/2016    History of gallstones    Personal history of other diseases of the digestive system 02/09/2016    History of hiatal hernia    Personal history of other malignant neoplasm of skin 02/09/2016    History of malignant neoplasm of skin    Pulmonary nodules     Pulmonary nodules     Vertigo    [2]   Past Surgical History:  Procedure Laterality Date    APPENDECTOMY  02/09/2016    Appendectomy    BREAST BIOPSY Right 01/28/2025    BREAST LUMPECTOMY Right 04/18/2025    with Magseed localization    CATARACT EXTRACTION Bilateral     COLONOSCOPY      CT ABDOMEN ANGIOGRAM W AND/OR WO IV CONTRAST  07/27/2013    CT ABDOMEN ANGIOGRAM W AND/OR WO IV CONTRAST [facility]    GASTRIC FUNDOPLICATION  2013    Esophagogastric Fundoplasty Nissen Fundoplication    HIP ARTHROPLASTY Bilateral     OTHER SURGICAL HISTORY  12/29/2019     Esophagogastroduodenoscopy    OTHER SURGICAL HISTORY  02/09/2016    Biopsy Skin    OTHER SURGICAL HISTORY      EGD with food impaction removal    TOTAL HIP ARTHROPLASTY  08/22/2019    Total Hip Replacement    UPPER GASTROINTESTINAL ENDOSCOPY     [3]   Family History  Problem Relation Name Age of Onset    Heart disease Mother      Dementia Brother     [4]   Social History  Tobacco Use    Smoking status: Never     Passive exposure: Never    Smokeless tobacco: Never   Vaping Use    Vaping status: Never Used   Substance Use Topics    Alcohol use: Not Currently    Drug use: Never        Erum Smith PA-C  07/13/25 7793

## 2025-07-14 LAB
ATRIAL RATE: 73 BPM
P AXIS: 54 DEGREES
P OFFSET: 185 MS
P ONSET: 122 MS
PR INTERVAL: 200 MS
Q ONSET: 222 MS
QRS COUNT: 12 BEATS
QRS DURATION: 80 MS
QT INTERVAL: 404 MS
QTC CALCULATION(BAZETT): 445 MS
QTC FREDERICIA: 431 MS
R AXIS: -2 DEGREES
T AXIS: 25 DEGREES
T OFFSET: 424 MS
VENTRICULAR RATE: 73 BPM

## 2025-07-25 ENCOUNTER — APPOINTMENT (OUTPATIENT)
Dept: CARDIOLOGY | Facility: CLINIC | Age: 85
End: 2025-07-25
Payer: MEDICARE

## 2025-08-11 ENCOUNTER — OFFICE VISIT (OUTPATIENT)
Dept: CARDIOLOGY | Facility: CLINIC | Age: 85
End: 2025-08-11
Payer: MEDICARE

## 2025-08-11 VITALS
OXYGEN SATURATION: 95 % | BODY MASS INDEX: 29.26 KG/M2 | HEART RATE: 74 BPM | HEIGHT: 62 IN | WEIGHT: 159 LBS | DIASTOLIC BLOOD PRESSURE: 83 MMHG | SYSTOLIC BLOOD PRESSURE: 143 MMHG

## 2025-08-11 DIAGNOSIS — R60.0 PEDAL EDEMA: Primary | ICD-10-CM

## 2025-08-11 PROCEDURE — 1036F TOBACCO NON-USER: CPT | Performed by: NURSE PRACTITIONER

## 2025-08-11 PROCEDURE — G2211 COMPLEX E/M VISIT ADD ON: HCPCS | Performed by: NURSE PRACTITIONER

## 2025-08-11 PROCEDURE — 1159F MED LIST DOCD IN RCRD: CPT | Performed by: NURSE PRACTITIONER

## 2025-08-11 PROCEDURE — 1160F RVW MEDS BY RX/DR IN RCRD: CPT | Performed by: NURSE PRACTITIONER

## 2025-08-11 PROCEDURE — 1126F AMNT PAIN NOTED NONE PRSNT: CPT | Performed by: NURSE PRACTITIONER

## 2025-08-11 PROCEDURE — 99214 OFFICE O/P EST MOD 30 MIN: CPT | Performed by: NURSE PRACTITIONER

## 2025-08-11 PROCEDURE — 99212 OFFICE O/P EST SF 10 MIN: CPT

## 2025-08-11 RX ORDER — FUROSEMIDE 20 MG/1
20 TABLET ORAL DAILY
Qty: 90 TABLET | Refills: 3 | Status: SHIPPED | OUTPATIENT
Start: 2025-08-11 | End: 2026-08-11

## 2025-08-11 ASSESSMENT — ENCOUNTER SYMPTOMS
MUSCULOSKELETAL NEGATIVE: 1
GASTROINTESTINAL NEGATIVE: 1
LOSS OF SENSATION IN FEET: 0
RESPIRATORY NEGATIVE: 1
CONSTITUTIONAL NEGATIVE: 1
NEUROLOGICAL NEGATIVE: 1
OCCASIONAL FEELINGS OF UNSTEADINESS: 0
DEPRESSION: 0

## 2025-08-11 ASSESSMENT — COLUMBIA-SUICIDE SEVERITY RATING SCALE - C-SSRS
1. IN THE PAST MONTH, HAVE YOU WISHED YOU WERE DEAD OR WISHED YOU COULD GO TO SLEEP AND NOT WAKE UP?: NO
6. HAVE YOU EVER DONE ANYTHING, STARTED TO DO ANYTHING, OR PREPARED TO DO ANYTHING TO END YOUR LIFE?: NO
2. HAVE YOU ACTUALLY HAD ANY THOUGHTS OF KILLING YOURSELF?: NO

## 2025-08-11 ASSESSMENT — PAIN SCALES - GENERAL: PAINLEVEL_OUTOF10: 0-NO PAIN

## 2025-08-18 ENCOUNTER — HOSPITAL ENCOUNTER (OUTPATIENT)
Dept: VASCULAR MEDICINE | Facility: CLINIC | Age: 85
Discharge: HOME | End: 2025-08-18
Payer: MEDICARE

## 2025-08-18 DIAGNOSIS — R60.0 PEDAL EDEMA: ICD-10-CM

## 2025-08-18 PROCEDURE — 93970 EXTREMITY STUDY: CPT | Performed by: STUDENT IN AN ORGANIZED HEALTH CARE EDUCATION/TRAINING PROGRAM

## 2025-08-18 PROCEDURE — 93970 EXTREMITY STUDY: CPT

## 2025-08-20 ENCOUNTER — APPOINTMENT (OUTPATIENT)
Dept: CARDIOLOGY | Facility: CLINIC | Age: 85
End: 2025-08-20
Payer: MEDICARE

## 2025-08-21 ENCOUNTER — RESULTS FOLLOW-UP (OUTPATIENT)
Dept: CARDIOLOGY | Facility: CLINIC | Age: 85
End: 2025-08-21
Payer: MEDICARE

## 2025-12-17 ENCOUNTER — APPOINTMENT (OUTPATIENT)
Dept: CARDIOLOGY | Facility: CLINIC | Age: 85
End: 2025-12-17
Payer: MEDICARE

## 2026-03-02 ENCOUNTER — APPOINTMENT (OUTPATIENT)
Dept: PRIMARY CARE | Facility: CLINIC | Age: 86
End: 2026-03-02
Payer: MEDICARE

## (undated) DEVICE — TIP, SUCTION, YANKAUER, BULB, ADULT

## (undated) DEVICE — DRAPE, TAPE, ORTHO

## (undated) DEVICE — SUTURE, VICRYL, 3-0, 54 IN, CTD, UNDYED

## (undated) DEVICE — BLADE, SURGICAL, POLYMER COATED P15, STERILE, DISP

## (undated) DEVICE — Device

## (undated) DEVICE — SOLUTION, IRRIGATION, STERILE WATER, 1000 ML, POUR BOTTLE

## (undated) DEVICE — SUTURE, PROLENE, 2-0, 30 IN, SH, BLUE

## (undated) DEVICE — SLEEVE, VASO PRESS, CALF GARMENT, MEDIUM, GREEN

## (undated) DEVICE — SUTURE, SILK, 2-0, 30 IN, SH, BLACK

## (undated) DEVICE — WATER STERILE, FOR IRRIGATION, 1000ML, W/HANGER

## (undated) DEVICE — STRIP, SKIN CLOSURE, STERI STRIP, REINFORCED, 0.5 X 4 IN

## (undated) DEVICE — PROBE COVER, INTRAOPERATIVE, 13 X 244CM (5 X 96IN)

## (undated) DEVICE — ELECTRODE, ELECTROSURGICAL, BLADE, INSULATED, ENT/IMA, STERILE

## (undated) DEVICE — DRAPE, LEGGINGS, 48 X 31 IN, STERILE, LF

## (undated) DEVICE — SUTURE, VICRYL, 3-0, 27 IN, SH

## (undated) DEVICE — SUTURE, MONOCRYL, 4-0, 27 IN, PS-2, UNDYED

## (undated) DEVICE — SUTURE, VICRYL, 2-0, 27 IN, SH, UNDYED

## (undated) DEVICE — GLOVE, SURGICAL, PROTEXIS PI , 6.5, PF, LF